# Patient Record
Sex: FEMALE | Race: WHITE | Employment: OTHER | URBAN - METROPOLITAN AREA
[De-identification: names, ages, dates, MRNs, and addresses within clinical notes are randomized per-mention and may not be internally consistent; named-entity substitution may affect disease eponyms.]

---

## 2017-11-15 ENCOUNTER — APPOINTMENT (OUTPATIENT)
Dept: PHYSICAL THERAPY | Facility: CLINIC | Age: 82
End: 2017-11-15
Payer: MEDICARE

## 2017-11-27 ENCOUNTER — APPOINTMENT (OUTPATIENT)
Dept: PHYSICAL THERAPY | Facility: CLINIC | Age: 82
End: 2017-11-27
Payer: MEDICARE

## 2017-11-27 PROCEDURE — G8978 MOBILITY CURRENT STATUS: HCPCS

## 2017-11-27 PROCEDURE — 97162 PT EVAL MOD COMPLEX 30 MIN: CPT

## 2017-11-27 PROCEDURE — G8979 MOBILITY GOAL STATUS: HCPCS

## 2018-11-19 ENCOUNTER — APPOINTMENT (EMERGENCY)
Dept: RADIOLOGY | Facility: HOSPITAL | Age: 83
DRG: 280 | End: 2018-11-19
Payer: MEDICARE

## 2018-11-19 ENCOUNTER — HOSPITAL ENCOUNTER (INPATIENT)
Facility: HOSPITAL | Age: 83
LOS: 5 days | Discharge: NURSING, REHAB OR LONG-TERM CARE | DRG: 280 | End: 2018-11-24
Attending: EMERGENCY MEDICINE | Admitting: INTERNAL MEDICINE
Payer: MEDICARE

## 2018-11-19 ENCOUNTER — APPOINTMENT (INPATIENT)
Dept: NEUROLOGY | Facility: AMBULATORY SURGERY CENTER | Age: 83
DRG: 280 | End: 2018-11-19
Payer: MEDICARE

## 2018-11-19 DIAGNOSIS — R52 PAIN: ICD-10-CM

## 2018-11-19 DIAGNOSIS — G93.40 ENCEPHALOPATHY: ICD-10-CM

## 2018-11-19 DIAGNOSIS — I16.0 HYPERTENSIVE URGENCY: ICD-10-CM

## 2018-11-19 DIAGNOSIS — K59.00 CONSTIPATION: ICD-10-CM

## 2018-11-19 DIAGNOSIS — R41.89 COGNITIVE IMPAIRMENT: ICD-10-CM

## 2018-11-19 DIAGNOSIS — N17.9 AKI (ACUTE KIDNEY INJURY) (HCC): ICD-10-CM

## 2018-11-19 DIAGNOSIS — L29.9 ITCHY SKIN: ICD-10-CM

## 2018-11-19 DIAGNOSIS — S09.90XA MINOR HEAD INJURY, INITIAL ENCOUNTER: ICD-10-CM

## 2018-11-19 DIAGNOSIS — E03.9 HYPOTHYROIDISM: ICD-10-CM
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
## 2018-11-19 DIAGNOSIS — I21.4 NSTEMI (NON-ST ELEVATED MYOCARDIAL INFARCTION) (HCC): Primary | ICD-10-CM

## 2018-11-19 DIAGNOSIS — I16.0 ASYMPTOMATIC HYPERTENSIVE URGENCY: ICD-10-CM

## 2018-11-19 PROBLEM — D75.1 POLYCYTHEMIA: Status: ACTIVE | Noted: 2018-11-19

## 2018-11-19 PROBLEM — I16.9 HYPERTENSIVE CRISIS: Status: ACTIVE | Noted: 2018-11-19

## 2018-11-19 PROBLEM — R55 NEAR SYNCOPE: Status: ACTIVE | Noted: 2018-11-19

## 2018-11-19 PROBLEM — R29.6 UNWITNESSED FALL: Status: ACTIVE | Noted: 2018-11-19

## 2018-11-19 PROBLEM — D72.829 LEUKOCYTOSIS: Status: ACTIVE | Noted: 2018-11-19

## 2018-11-19 PROBLEM — E83.52 HYPERCALCEMIA: Status: ACTIVE | Noted: 2018-11-19

## 2018-11-19 LAB
ALBUMIN SERPL BCP-MCNC: 4 G/DL (ref 3.5–5)
ALP SERPL-CCNC: 111 U/L (ref 46–116)
ALT SERPL W P-5'-P-CCNC: 56 U/L (ref 12–78)
AMPHETAMINES SERPL QL SCN: NEGATIVE
ANION GAP SERPL CALCULATED.3IONS-SCNC: 9 MMOL/L (ref 4–13)
APTT PPP: >210 SECONDS (ref 26–38)
AST SERPL W P-5'-P-CCNC: 53 U/L (ref 5–45)
ATRIAL RATE: 79 BPM
BARBITURATES UR QL: NEGATIVE
BASE EX.OXY STD BLDV CALC-SCNC: 67.7 % (ref 60–80)
BASE EXCESS BLDV CALC-SCNC: -3.8 MMOL/L
BASOPHILS # BLD AUTO: 0.06 THOUSANDS/ΜL (ref 0–0.1)
BASOPHILS NFR BLD AUTO: 0 % (ref 0–1)
BENZODIAZ UR QL: NEGATIVE
BILIRUB SERPL-MCNC: 1.33 MG/DL (ref 0.2–1)
BUN SERPL-MCNC: 60 MG/DL (ref 5–25)
CALCIUM SERPL-MCNC: 10.8 MG/DL (ref 8.3–10.1)
CHLORIDE SERPL-SCNC: 109 MMOL/L (ref 100–108)
CK MB SERPL-MCNC: 21.4 NG/ML (ref 0–5)
CK MB SERPL-MCNC: 5.5 % (ref 0–2.5)
CK SERPL-CCNC: 387 U/L (ref 26–192)
CO2 SERPL-SCNC: 24 MMOL/L (ref 21–32)
COCAINE UR QL: NEGATIVE
CREAT SERPL-MCNC: 1.04 MG/DL (ref 0.6–1.3)
EOSINOPHIL # BLD AUTO: 0.18 THOUSAND/ΜL (ref 0–0.61)
EOSINOPHIL NFR BLD AUTO: 1 % (ref 0–6)
ERYTHROCYTE [DISTWIDTH] IN BLOOD BY AUTOMATED COUNT: 14.3 % (ref 11.6–15.1)
GFR SERPL CREATININE-BSD FRML MDRD: 47 ML/MIN/1.73SQ M
GLUCOSE SERPL-MCNC: 117 MG/DL (ref 65–140)
HCO3 BLDV-SCNC: 22.4 MMOL/L (ref 24–30)
HCT VFR BLD AUTO: 57.7 % (ref 34.8–46.1)
HGB BLD-MCNC: 18.8 G/DL (ref 11.5–15.4)
IMM GRANULOCYTES # BLD AUTO: 0.17 THOUSAND/UL (ref 0–0.2)
IMM GRANULOCYTES NFR BLD AUTO: 1 % (ref 0–2)
INR PPP: 1.25 (ref 0.86–1.17)
LYMPHOCYTES # BLD AUTO: 2.56 THOUSANDS/ΜL (ref 0.6–4.47)
LYMPHOCYTES NFR BLD AUTO: 16 % (ref 14–44)
MCH RBC QN AUTO: 30 PG (ref 26.8–34.3)
MCHC RBC AUTO-ENTMCNC: 32.6 G/DL (ref 31.4–37.4)
MCV RBC AUTO: 92 FL (ref 82–98)
METHADONE UR QL: NEGATIVE
MONOCYTES # BLD AUTO: 1.42 THOUSAND/ΜL (ref 0.17–1.22)
MONOCYTES NFR BLD AUTO: 9 % (ref 4–12)
NEUTROPHILS # BLD AUTO: 12.06 THOUSANDS/ΜL (ref 1.85–7.62)
NEUTS SEG NFR BLD AUTO: 73 % (ref 43–75)
NRBC BLD AUTO-RTO: 0 /100 WBCS
O2 CT BLDV-SCNC: 17 ML/DL
OPIATES UR QL SCN: NEGATIVE
P AXIS: 66 DEGREES
PCO2 BLDV: 44.6 MM HG (ref 42–50)
PCP UR QL: NEGATIVE
PH BLDV: 7.32 [PH] (ref 7.3–7.4)
PLATELET # BLD AUTO: 198 THOUSANDS/UL (ref 149–390)
PMV BLD AUTO: 12.6 FL (ref 8.9–12.7)
PO2 BLDV: 39.4 MM HG (ref 35–45)
POTASSIUM SERPL-SCNC: 4.4 MMOL/L (ref 3.5–5.3)
PR INTERVAL: 134 MS
PROT SERPL-MCNC: 7.8 G/DL (ref 6.4–8.2)
PROTHROMBIN TIME: 15.8 SECONDS (ref 11.8–14.2)
QRS AXIS: -70 DEGREES
QRSD INTERVAL: 90 MS
QT INTERVAL: 388 MS
QTC INTERVAL: 444 MS
RBC # BLD AUTO: 6.26 MILLION/UL (ref 3.81–5.12)
SODIUM SERPL-SCNC: 142 MMOL/L (ref 136–145)
T WAVE AXIS: 107 DEGREES
T4 FREE SERPL-MCNC: 0.93 NG/DL (ref 0.76–1.46)
THC UR QL: NEGATIVE
TROPONIN I SERPL-MCNC: 0.24 NG/ML
TROPONIN I SERPL-MCNC: 0.27 NG/ML
TROPONIN I SERPL-MCNC: 0.27 NG/ML
TROPONIN I SERPL-MCNC: 0.28 NG/ML
TSH SERPL DL<=0.05 MIU/L-ACNC: 11.6 UIU/ML (ref 0.36–3.74)
VENTRICULAR RATE: 79 BPM
WBC # BLD AUTO: 16.45 THOUSAND/UL (ref 4.31–10.16)

## 2018-11-19 PROCEDURE — 92610 EVALUATE SWALLOWING FUNCTION: CPT

## 2018-11-19 PROCEDURE — 93010 ELECTROCARDIOGRAM REPORT: CPT | Performed by: INTERNAL MEDICINE

## 2018-11-19 PROCEDURE — 82805 BLOOD GASES W/O2 SATURATION: CPT | Performed by: INTERNAL MEDICINE

## 2018-11-19 PROCEDURE — 85610 PROTHROMBIN TIME: CPT | Performed by: EMERGENCY MEDICINE

## 2018-11-19 PROCEDURE — 85730 THROMBOPLASTIN TIME PARTIAL: CPT | Performed by: EMERGENCY MEDICINE

## 2018-11-19 PROCEDURE — 87040 BLOOD CULTURE FOR BACTERIA: CPT | Performed by: INTERNAL MEDICINE

## 2018-11-19 PROCEDURE — 99221 1ST HOSP IP/OBS SF/LOW 40: CPT | Performed by: INTERNAL MEDICINE

## 2018-11-19 PROCEDURE — 96360 HYDRATION IV INFUSION INIT: CPT

## 2018-11-19 PROCEDURE — 70450 CT HEAD/BRAIN W/O DYE: CPT

## 2018-11-19 PROCEDURE — 84439 ASSAY OF FREE THYROXINE: CPT | Performed by: EMERGENCY MEDICINE

## 2018-11-19 PROCEDURE — 85025 COMPLETE CBC W/AUTO DIFF WBC: CPT | Performed by: EMERGENCY MEDICINE

## 2018-11-19 PROCEDURE — 93005 ELECTROCARDIOGRAM TRACING: CPT

## 2018-11-19 PROCEDURE — 81001 URINALYSIS AUTO W/SCOPE: CPT | Performed by: INTERNAL MEDICINE

## 2018-11-19 PROCEDURE — 99223 1ST HOSP IP/OBS HIGH 75: CPT | Performed by: INTERNAL MEDICINE

## 2018-11-19 PROCEDURE — G8997 SWALLOW GOAL STATUS: HCPCS

## 2018-11-19 PROCEDURE — 36415 COLL VENOUS BLD VENIPUNCTURE: CPT | Performed by: EMERGENCY MEDICINE

## 2018-11-19 PROCEDURE — G8996 SWALLOW CURRENT STATUS: HCPCS

## 2018-11-19 PROCEDURE — 95816 EEG AWAKE AND DROWSY: CPT | Performed by: PSYCHIATRY & NEUROLOGY

## 2018-11-19 PROCEDURE — 71046 X-RAY EXAM CHEST 2 VIEWS: CPT

## 2018-11-19 PROCEDURE — 99285 EMERGENCY DEPT VISIT HI MDM: CPT

## 2018-11-19 PROCEDURE — 80053 COMPREHEN METABOLIC PANEL: CPT | Performed by: EMERGENCY MEDICINE

## 2018-11-19 PROCEDURE — 84484 ASSAY OF TROPONIN QUANT: CPT | Performed by: PHYSICIAN ASSISTANT

## 2018-11-19 PROCEDURE — 72125 CT NECK SPINE W/O DYE: CPT

## 2018-11-19 PROCEDURE — 82550 ASSAY OF CK (CPK): CPT | Performed by: EMERGENCY MEDICINE

## 2018-11-19 PROCEDURE — 84443 ASSAY THYROID STIM HORMONE: CPT | Performed by: EMERGENCY MEDICINE

## 2018-11-19 PROCEDURE — 1124F ACP DISCUSS-NO DSCNMKR DOCD: CPT | Performed by: INTERNAL MEDICINE

## 2018-11-19 PROCEDURE — 80307 DRUG TEST PRSMV CHEM ANLYZR: CPT | Performed by: INTERNAL MEDICINE

## 2018-11-19 PROCEDURE — 84484 ASSAY OF TROPONIN QUANT: CPT | Performed by: EMERGENCY MEDICINE

## 2018-11-19 PROCEDURE — 82553 CREATINE MB FRACTION: CPT | Performed by: EMERGENCY MEDICINE

## 2018-11-19 PROCEDURE — 95816 EEG AWAKE AND DROWSY: CPT

## 2018-11-19 PROCEDURE — 84484 ASSAY OF TROPONIN QUANT: CPT | Performed by: INTERNAL MEDICINE

## 2018-11-19 RX ORDER — HEPARIN SODIUM 10000 [USP'U]/100ML
3-20 INJECTION, SOLUTION INTRAVENOUS
Status: DISCONTINUED | OUTPATIENT
Start: 2018-11-19 | End: 2018-11-19

## 2018-11-19 RX ORDER — ASPIRIN 81 MG/1
81 TABLET ORAL DAILY
Status: DISCONTINUED | OUTPATIENT
Start: 2018-11-20 | End: 2018-11-24 | Stop reason: HOSPADM

## 2018-11-19 RX ORDER — QUINAPRIL 20 MG/1
20 TABLET ORAL
COMMUNITY

## 2018-11-19 RX ORDER — SODIUM CHLORIDE 9 MG/ML
100 INJECTION, SOLUTION INTRAVENOUS CONTINUOUS
Status: DISCONTINUED | OUTPATIENT
Start: 2018-11-19 | End: 2018-11-19

## 2018-11-19 RX ORDER — HYDRALAZINE HYDROCHLORIDE 20 MG/ML
10 INJECTION INTRAMUSCULAR; INTRAVENOUS EVERY 6 HOURS PRN
Status: DISCONTINUED | OUTPATIENT
Start: 2018-11-19 | End: 2018-11-24 | Stop reason: HOSPADM

## 2018-11-19 RX ORDER — METOPROLOL TARTRATE 5 MG/5ML
5 INJECTION INTRAVENOUS ONCE
Status: COMPLETED | OUTPATIENT
Start: 2018-11-19 | End: 2018-11-19

## 2018-11-19 RX ORDER — OXYBUTYNIN CHLORIDE 5 MG/1
5 TABLET, EXTENDED RELEASE ORAL DAILY
Status: DISCONTINUED | OUTPATIENT
Start: 2018-11-19 | End: 2018-11-20

## 2018-11-19 RX ORDER — LEVOTHYROXINE SODIUM 0.03 MG/1
25 TABLET ORAL
Status: DISCONTINUED | OUTPATIENT
Start: 2018-11-19 | End: 2018-11-24 | Stop reason: HOSPADM

## 2018-11-19 RX ORDER — HYDRALAZINE HYDROCHLORIDE 20 MG/ML
5 INJECTION INTRAMUSCULAR; INTRAVENOUS EVERY 6 HOURS PRN
Status: DISCONTINUED | OUTPATIENT
Start: 2018-11-19 | End: 2018-11-19

## 2018-11-19 RX ORDER — SODIUM CHLORIDE 9 MG/ML
50 INJECTION, SOLUTION INTRAVENOUS ONCE
Status: COMPLETED | OUTPATIENT
Start: 2018-11-19 | End: 2018-11-19

## 2018-11-19 RX ORDER — ASPIRIN 81 MG/1
324 TABLET, CHEWABLE ORAL ONCE
Status: COMPLETED | OUTPATIENT
Start: 2018-11-19 | End: 2018-11-19

## 2018-11-19 RX ORDER — HEPARIN SODIUM 1000 [USP'U]/ML
1650 INJECTION, SOLUTION INTRAVENOUS; SUBCUTANEOUS AS NEEDED
Status: DISCONTINUED | OUTPATIENT
Start: 2018-11-19 | End: 2018-11-19

## 2018-11-19 RX ORDER — LABETALOL HYDROCHLORIDE 5 MG/ML
5 INJECTION, SOLUTION INTRAVENOUS EVERY 6 HOURS PRN
Status: DISCONTINUED | OUTPATIENT
Start: 2018-11-19 | End: 2018-11-24 | Stop reason: HOSPADM

## 2018-11-19 RX ORDER — AMLODIPINE BESYLATE 5 MG/1
5 TABLET ORAL DAILY
Status: DISCONTINUED | OUTPATIENT
Start: 2018-11-19 | End: 2018-11-22

## 2018-11-19 RX ORDER — SOLIFENACIN SUCCINATE 5 MG/1
5 TABLET, FILM COATED ORAL DAILY
COMMUNITY

## 2018-11-19 RX ORDER — LEVOTHYROXINE SODIUM 0.03 MG/1
25 TABLET ORAL DAILY
COMMUNITY

## 2018-11-19 RX ORDER — ACETAMINOPHEN 325 MG/1
650 TABLET ORAL EVERY 6 HOURS PRN
Status: DISCONTINUED | OUTPATIENT
Start: 2018-11-19 | End: 2018-11-20

## 2018-11-19 RX ORDER — ATORVASTATIN CALCIUM 40 MG/1
40 TABLET, FILM COATED ORAL
Status: DISCONTINUED | OUTPATIENT
Start: 2018-11-19 | End: 2018-11-20

## 2018-11-19 RX ORDER — HEPARIN SODIUM 1000 [USP'U]/ML
3300 INJECTION, SOLUTION INTRAVENOUS; SUBCUTANEOUS AS NEEDED
Status: DISCONTINUED | OUTPATIENT
Start: 2018-11-19 | End: 2018-11-19

## 2018-11-19 RX ORDER — CARVEDILOL 3.12 MG/1
3.12 TABLET ORAL 2 TIMES DAILY WITH MEALS
Status: DISCONTINUED | OUTPATIENT
Start: 2018-11-19 | End: 2018-11-21

## 2018-11-19 RX ORDER — HEPARIN SODIUM 1000 [USP'U]/ML
3300 INJECTION, SOLUTION INTRAVENOUS; SUBCUTANEOUS ONCE
Status: COMPLETED | OUTPATIENT
Start: 2018-11-19 | End: 2018-11-19

## 2018-11-19 RX ADMIN — HYDRALAZINE HYDROCHLORIDE 5 MG: 20 INJECTION INTRAMUSCULAR; INTRAVENOUS at 15:09

## 2018-11-19 RX ADMIN — OXYBUTYNIN CHLORIDE 5 MG: 5 TABLET, EXTENDED RELEASE ORAL at 14:22

## 2018-11-19 RX ADMIN — HEPARIN SODIUM AND DEXTROSE 12 UNITS/KG/HR: 10000; 5 INJECTION INTRAVENOUS at 11:41

## 2018-11-19 RX ADMIN — HEPARIN SODIUM 3300 UNITS: 1000 INJECTION INTRAVENOUS; SUBCUTANEOUS at 11:41

## 2018-11-19 RX ADMIN — SODIUM CHLORIDE 1000 ML: 0.9 INJECTION, SOLUTION INTRAVENOUS at 10:10

## 2018-11-19 RX ADMIN — AMLODIPINE BESYLATE 5 MG: 5 TABLET ORAL at 14:22

## 2018-11-19 RX ADMIN — LEVOTHYROXINE SODIUM 25 MCG: 25 TABLET ORAL at 14:22

## 2018-11-19 RX ADMIN — Medication 324 MG: at 11:43

## 2018-11-19 RX ADMIN — ATORVASTATIN CALCIUM 40 MG: 40 TABLET, FILM COATED ORAL at 16:51

## 2018-11-19 RX ADMIN — SODIUM CHLORIDE 100 ML/HR: 0.9 INJECTION, SOLUTION INTRAVENOUS at 14:12

## 2018-11-19 RX ADMIN — CARVEDILOL 3.12 MG: 3.12 TABLET, FILM COATED ORAL at 16:51

## 2018-11-19 RX ADMIN — SODIUM CHLORIDE 50 ML/HR: 0.9 INJECTION, SOLUTION INTRAVENOUS at 16:56

## 2018-11-19 RX ADMIN — METOPROLOL TARTRATE 5 MG: 1 INJECTION, SOLUTION INTRAVENOUS at 11:35

## 2018-11-19 NOTE — ASSESSMENT & PLAN NOTE
Patient denies any fall, but per EMS, patient was found on the floor in the pool of feces and was conscious  It is unclear if patient had syncopal event or seizure like activity  Near syncopal event vs syncope related to dehydration vs hypertensive encephalopathy vs NSTEMI vs infection (less likely)? CT head shows No acute intracranial abnormality with some microangiopathic changes  CT C-spine shows no acute fracture  PLAN:  · Monitor on telemetry  · IV hydration  · Trend troponin  · Check echocardiogram  · Check UA  · Check VBG  · Check blood cultures,  · Check UDS  · Check orthostatic vitals  · PT OT evaluation  · Check EEG

## 2018-11-19 NOTE — ASSESSMENT & PLAN NOTE
Likely related to noncompliance of medications  She is on Quinapril at home  As per her, recently home blood pressure has been running high in 150s at home  Now with QUIANA and encephalopathy  Blood pressure down to 178 in the ER with 5 mg Lopressor  CT head negative for any intracranial bleed  PLAN:  · P r n  Labetalol for systolic blood pressure more than 180  · Goal blood pressure today between 160-180  · Will hold off on quinapril for now given QUIANA  · Start amlodipine p jaylen Peña

## 2018-11-19 NOTE — PLAN OF CARE
Problem: SLP ADULT - SWALLOWING, IMPAIRED  Goal: Initial SLP swallow eval performed  Outcome: Completed Date Met: 11/19/18

## 2018-11-19 NOTE — ASSESSMENT & PLAN NOTE
Has history of polycythemia with baseline hemoglobin of 16- 16 5 with low to normal erythropoietin  Not on any treatment for now  Hemoglobin is 18 , likely increased due to dehydration  PLAN:  -IV hydration   -Trend CBC for now

## 2018-11-19 NOTE — SPEECH THERAPY NOTE
Speech-Language Pathology Bedside Swallow Evaluation      Patient Name: Hector Luciano    WSLBI'D Date: 11/19/2018     Problem List  Patient Active Problem List   Diagnosis    Near syncope with unwitnessed fall    Leukocytosis    Polycythemia    [facility] failure [facility]    NSTEMI (non-ST elevated myocardial infarction) ([facility]    Encephalopathy    Hypertensive crisis    Hypercalcemia       Past Medical History  Past Medical History:   Diagnosis Date    Hypertension        Past Surgical History  History reviewed  No pertinent surgical history  Summary   Pt presented with s/s suggestive of minimal oral dysphagia and suspected WFL pharyngeal swallowing skills  Symptoms or concerns included oral residue with cookies, cleared with liquid wash  No s/s aspiration noted with the materials assessed today  Suspect oral residual to be d/t significant oral dryness prior to PO intake  Suspect pt will be able to tolerate regular diet and thin liquids, ST to f/u x1-2 for analysis of diet tolerance  Risk for Aspiration: low    Recommendations: regular diet and thin liquids     Recommended Form of Meds: whole with liquid     Aspiration precautions and compensatory swallowing strategies: upright posture, only feed when fully alert, slow rate of feeding, small bites/sips and alternating bites and sips      Current Medical Status per Dr Alfredito Martel H&P 11/19/2018  Hector Luciano is a 80 y o  female with medical history of hypertension, hyperlipidemia and hypothyroidism was brought in by EMS for unwitnessed fall and possible near-syncopal event  Patient is a poor historian  As per ER staff, patient lives independently  And her neighbors noted that newspapers was stacking for 3 days and the got concerned about her which prompted them to call EMS  On EMS arrival, patient was found on the floor with unkempt house and the cat feces in apartment   Patient was conscious but was not able to get up by herself due to generalized weakness, s she was brought to ER,  As per patient, she was in her bed and was normal prior to going to bed last night  She says that she felt too weak to get of of bed  When I asked her about newspaper outside her apartment, she mentioned that she did not take them from door as she did not want to read newspaper  She reports extreme generalized weakness prior to coming to ER  She reports poor PO intake  She says she does not drink enough water, and eats only frozen food which she gets from store but she has not been eating well lately  She also reports some urinary frequency  Per patient, her Bp has been running high lately with systolic BP in 444R  She Denied any syncopal event  She denied any chest pain, lightheadedness, nausea, vomiting, seizure-like activity at home or in ER  Past medical history:  Please see H&P for details    Special Studies:  CXR 11/19/2018 IMPRESSION:  No acute cardiopulmonary disease  Social/Education/Vocational Hx:  Pt lives alone      Swallow Information   Current Risks for Dysphagia & Aspiration: reported coughing during RN aspiration screening     Current Symptoms/Concerns: Pt reports she is excessively dry    Current Diet: NPO      Baseline Diet: regular diet and thin liquids, pt reported she avoids hard food items such as steak      Baseline Assessment   Behavior/Cognition: alert    Speech/Language Status: able to participate in conversation and able to follow commands    Patient Positioning: upright in bed    Pain Status/Interventions/Response to Interventions:  No report of or nonverbal indications of pain  Swallow Mechanism Exam   Oral-motor structures and function are WNL for symmetry, strength, ROM & coordination    Facial: symmetrical  Labial: anterior spillage initially, however suspect this was due to dry mucosa rather than labial weakness  Lingual: WFL  Velum: symmetrical  Mandible: adequate ROM  Dentition: adequate  Vocal quality:clear/adequate Volitional Cough: adequate     Consistencies Assessed and Performance   Consistencies Administered: thin liquids, puree and hard solids  Specific materials administered included water, pudding, and leeann doone cookies    Oral Stage: minimal  Mastication was adequate with the materials administered today  Bolus formation and transfer were functional   Mild oral residue noted with cookie, suspected to be due to significant oral dryness  No overt s/s reduced oral control  Pharyngeal Stage: WFL  Swallow Mechanics:  Swallowing initiation appeared prompt  Laryngeal rise was palpated and judged to be within functional limits  No coughing, throat clearing, change in vocal quality or respiratory status noted today  Esophageal Concerns: none reported    Strategies and Efficacy: Alternation of liquids and solids effectively cleared oral residual      Summary and Recommendations (see above)      Results Reviewed with: patient and RN     Treatment Recommended: Brief ST f/u during meal to further assess regular diet tolerance    Frequency of treatment: 1-2x    Patient Stated Goal: "I'm so thirsty "    Dysphagia Goals per SLP: pt will tolerate regular diet with thin liquids without s/s of aspiration x72hrs    Pt Education: initiated  Pt and caregivers would benefit from/require continued education

## 2018-11-19 NOTE — CONSULTS
Consultation - Cardiology Team One  Leticia Cleaning 80 y o  female MRN: [id number]  Unit/Bed#: [facility] 405-01 Encounter: [id number]    Inpatient consult to Cardiology  Consult performed by: Bhupendra Pa ordered by: [facility] [place], operated by [facility]      Physician Requesting Consult: Virginia He MD  Reason for Consult / Principal Problem: NSTEMI       Assessment/ Plan    1  NSTEMI type II in the setting of hypertensive urgency and QUIANA   Troponin 0 28/0 27  Currently on heparin gtt  Echocardiogram pending    Echocardiogram 05/02/2016 showed EF 89%, mild diastolic dysfunction with normal filling pressures, aortic valve is mildly thickened and calcified and mild TR    2  Possible syncope vs fall vs seizure- patient denying syncope/LOC    EEG pending   Echo pending     3  Hypertensive urgency- /111 on admission   BP improved after metoprolol 5 mg IV x 1 dose: 171/113  Holding home ACE I due to #4  Started on amlodipine  BP now 222/88  Will give hydralazine IV now and continue to monitor, d/w RN  4  QUIANA in the setting of dehydration- creatinine 1 04  Baseline appears 0 6  On IVF     5  Hypothyroidism- TSH 11 6      History of Present Illness   HPI: Leticia Cleaning is a 80y o  year old female who has a history of hypothyroidism and hypertension  She does not follow with a cardiologist              She presents to [facility]"" ER 11/19/18 via EMS due to being found at home on the floor in unkept conditions  Her landlord was becoming concerned because of multiple newspapers at her door and called EMS  Patient is a poor historian but per records patient was found on the floor with a bruise on her forehead  She reports feeling weak and tired for the past week and not eating well  She denies LOC or syncope but does report being on the floor for "awhile " Patient denies history of syncope in the past      Cardiology consulted for NSTEMI and possible syncope  Patient having EEG completed now  Troponin 0 28/0  27   ECG showed NSR with non specific ST and T wave abnormalities  Patient denies cardiac symptoms including chest pain, SOB or palpitations  She reports no history of known CAD, heart failure or dysrhythmias  She does have a history of hypertension and takes quinapril at home  Patient reports she has not been compliant with her medication  BP on admission was 243/111  She received metoprolol 5 mg IV x 1 dose and BP  Improved to 178/83 but elevated again 227/89  Patient denies headache, chest pain, SOB or dizziness  Patient lives in a apartment alone  She denies tobacco use or alcohol use  EKG reviewed personally:  Normal sinus rhythm   Ventricular rate 79 bpm  OK interval 134 ms  QRSD interval 90 ms  QT interval 388 ms  QTc interval 444 ms    Telemetry reviewed personally:   NSR HR 65-70s    Review of Systems   Constitution: Positive for decreased appetite, weakness and malaise/fatigue  Cardiovascular: Negative for chest pain, dyspnea on exertion, leg swelling, orthopnea, palpitations and syncope  Respiratory: Negative for shortness of breath  Musculoskeletal: Positive for falls  Gastrointestinal: Negative for nausea and vomiting  Neurological: Negative for dizziness and light-headedness  Psychiatric/Behavioral: Negative for altered mental status  Historical Information   Past Medical History:   Diagnosis Date    Hypertension      History reviewed  No pertinent surgical history  History   Alcohol Use    Yes     History   Drug Use No     History   Smoking Status    Former Smoker    Types: Cigarettes   Smokeless Tobacco    Never Used     Family History: History reviewed  No pertinent family history      Meds/Allergies   all current active meds have been reviewed and current meds:   Current Facility-Administered Medications   Medication Dose Route Frequency    acetaminophen (TYLENOL) tablet 650 mg  650 mg Oral Q6H PRN    amLODIPine (NORVASC) tablet 5 mg  5 mg Oral Daily    [START ON 11/20/2018] aspirin (ECOTRIN LOW STRENGTH) EC tablet 81 mg  81 mg Oral Daily    atorvastatin (LIPITOR) tablet 40 mg  40 mg Oral Daily With Dinner    bimatoprost (LUMIGAN) 0 01 % ophthalmic solution 1 drop  1 drop Both Eyes HS    heparin (porcine) 25,000 units in 250 mL infusion (premix)  3-20 Units/kg/hr (Order-Specific) Intravenous Titrated    heparin (porcine) injection 1,650 Units  1,650 Units Intravenous PRN    heparin (porcine) injection 3,300 Units  3,300 Units Intravenous PRN    hydrALAZINE (APRESOLINE) injection 5 mg  5 mg Intravenous Q6H PRN    labetalol (NORMODYNE) injection 5 mg  5 mg Intravenous Q6H PRN    levothyroxine tablet 25 mcg  25 mcg Oral Early Morning    oxybutynin (DITROPAN-XL) 24 hr tablet 5 mg  5 mg Oral Daily    sodium chloride 0 9 % infusion  100 mL/hr Intravenous Continuous       heparin (porcine) 3-20 Units/kg/hr (Order-Specific) Last Rate: 9 Units/kg/hr (11/19/18 1420)   sodium chloride 100 mL/hr Last Rate: 100 mL/hr (11/19/18 1412)       No Known Allergies    Objective   Vitals: Blood pressure (!) 222/88, pulse 67, temperature 97 6 °F (36 4 °C), temperature source Rectal, resp  rate (!) 26, weight 56 2 kg (124 lb), SpO2 95 %  ,     There is no height or weight on file to calculate BMI ,     Systolic (68RPI), ELZ:194 , Min:171 , ECI:847     Diastolic (87IGV), DSM:25, Min:83, Max:113      Intake/Output Summary (Last 24 hours) at 11/19/18 1444  Last data filed at 11/19/18 1337   Gross per 24 hour   Intake             1000 ml   Output               91 ml   Net              909 ml     Weight (last 2 days)     Date/Time   Weight    11/19/18 1006  56 2 (124)            Invasive Devices     Peripheral Intravenous Line            Peripheral IV 11/19/18 Left Wrist less than 1 day    Peripheral IV 11/19/18 Right Antecubital less than 1 day              Physical Exam   Constitutional: She is oriented to person, place, and time  No distress  Neck: Neck supple     Cardiovascular: Normal rate, regular rhythm and intact distal pulses  Pulmonary/Chest: Effort normal  No respiratory distress  She has no wheezes  No crackles  RA    Abdominal: Soft  Bowel sounds are normal    Musculoskeletal: She exhibits no edema  Neurological: She is alert and oriented to person, place, and time  Skin: Skin is warm and dry  She is not diaphoretic  Psychiatric: She has a normal mood and affect  Her behavior is normal          LABORATORY RESULTS:    Results from last 7 days  Lab Units 11/19/18  1313 11/19/18  1010   CK TOTAL U/L  --  387*   TROPONIN I ng/mL 0 27* 0 28*   CK MB INDEX %  --  5 5*     CBC with diff:   Results from last 7 days  Lab Units 11/19/18  1010   WBC Thousand/uL 16 45*   HEMOGLOBIN g/dL 18 8*   HEMATOCRIT % 57 7*   MCV fL 92   PLATELETS Thousands/uL 198   MCH pg 30 0   MCHC g/dL 32 6   RDW % 14 3   MPV fL 12 6   NRBC AUTO /100 WBCs 0       CMP:  Results from last 7 days  Lab Units 11/19/18  1010   POTASSIUM mmol/L 4 4   CHLORIDE mmol/L 109*   CO2 mmol/L 24   BUN mg/dL 60*   CREATININE mg/dL 1 04   CALCIUM mg/dL 10 8*   AST U/L 53*   ALT U/L 56   ALK PHOS U/L 111   EGFR ml/min/1 73sq m 47       BMP:  Results from last 7 days  Lab Units 11/19/18  1010   POTASSIUM mmol/L 4 4   CHLORIDE mmol/L 109*   CO2 mmol/L 24   BUN mg/dL 60*   CREATININE mg/dL 1 04   CALCIUM mg/dL 10 8*          Results from last 7 days  Lab Units 11/19/18  1010   TSH 3RD GENERATON uIU/mL 11 600*   FREE T4 ng/dL 0 93         Results from last 7 days  Lab Units 11/19/18  1139   INR  1 25*     Lipid Profile:   No results found for: CHOL  No results found for: HDL  No results found for: LDLCALC  No results found for: TRIG      Cardiac testing:   No results found for this or any previous visit  No results found for this or any previous visit  No procedure found  No results found for this or any previous visit  Imaging: I have personally reviewed pertinent reports      Xr Chest 2 Views    Result Date: 11/19/2018  Narrative: CHEST INDICATION:   found down  COMPARISON:  Chest radiographs March 10, 2013 EXAM PERFORMED/VIEWS:  XR CHEST PA & LATERAL Images: 2 FINDINGS: The heart is mildly enlarged  Atherosclerotic changes in the aorta  The lungs are clear and hyperinflated  No pneumothorax or pleural effusion  Osseous structures demineralized  Increase in the thoracic kyphotic curvature  Degenerative changes in the right shoulder with findings suggestive of chronic rotator cuff injury  Postoperative changes left shoulder  Impression: No acute cardiopulmonary disease  Workstation performed: [id number]     Ct Head Without Contrast    Result Date: 11/19/2018  Narrative: CT BRAIN - WITHOUT CONTRAST INDICATION:   found down  pt found down, confused, bruise mid forehead, unsure how she fell COMPARISON:  None  TECHNIQUE:  CT examination of the brain was performed  In addition to axial images, coronal 2D reformatted images were created and submitted for interpretation  Radiation dose length product (DLP) for this visit:  926 28 mGy-cm   This examination, like all CT scans performed in the [facility], was performed utilizing techniques to minimize radiation dose exposure, including the use of iterative  reconstruction and automated exposure control  IMAGE QUALITY:  Diagnostic  FINDINGS: PARENCHYMA: Decreased attenuation is noted in periventricular and subcortical white matter demonstrating an appearance that is statistically most likely to represent moderate microangiopathic change  Atherosclerotic calcifications of the cavernous segment of the internal carotid artery are moderate  No CT signs of acute infarction  No intracranial mass, mass effect or midline shift  No acute parenchymal hemorrhage  VENTRICLES AND EXTRA-AXIAL SPACES:  Normal for the patient's age   VISUALIZED ORBITS AND PARANASAL SINUSES:  Bilateral lens implants CALVARIUM AND EXTRACRANIAL SOFT TISSUES:  Normal      Impression: No acute intracranial abnormality  Microangiopathic changes  Workstation performed: [id number]     Ct Cervical Spine Without Contrast    Result Date: 11/19/2018  Narrative: CT CERVICAL SPINE - WITHOUT CONTRAST INDICATION:   found down  pt found down, confused, bruise mid forehead, unsure how she fell COMPARISON:  None  TECHNIQUE:  CT examination of the cervical spine was performed without intravenous contrast   Contiguous axial images were obtained  Sagittal and coronal reconstructions were performed  Radiation dose length product (DLP) for this visit:  316 95 mGy-cm   This examination, like all CT scans performed in the [facility], was performed utilizing techniques to minimize radiation dose exposure, including the use of iterative  reconstruction and automated exposure control  IMAGE QUALITY:  Diagnostic  FINDINGS: ALIGNMENT:  Trace grade 1 anterolisthesis of C2 on C3  VERTEBRAL BODIES:  No fracture  DEGENERATIVE CHANGES:  Scattered multilevel degenerative endplate changes noted  Advanced degenerative changes at the C1-C2 articulation where there is right-sided ankylosis and prominent ventral osteophyte formation anteriorly at the C3 1 C2 articulation  Multilevel facet arthropathy noted elsewhere  PREVERTEBRAL AND PARASPINAL SOFT TISSUES:  Atherosclerotic calcifications noted  THORACIC INLET:  Normal      Impression: 1  No acute fracture  2   Mild grade 1 anterolisthesis of C2 on C3 may be spondylotic in etiology  3   Advanced degenerative changes anteriorly at the C1-C2 articulation acentrically on the right  Scattered moderate spondylotic changes elsewhere  Workstation performed: [id number]     Thank you for allowing us to participate in this patient's care  Counseling / Coordination of Care  Total floor / unit time spent today 45 minutes  Greater than 50% of total time was spent with the patient and / or family counseling and / or coordination of care    A description of the counseling / coordination of care: Review of history, current assessment, development of a plan  Code Status: Level 1 - Full Code    ** Please Note: Dragon 360 Dictation voice to text software may have been used in the creation of this document   **

## 2018-11-19 NOTE — ED NOTES
Dr Gordon Mcclain notified of pt's PTT value at this time and notified of repeat PTT drawn 2 minutes post heparin bolus on the opposite arm  Instructed to follow heparin protocol for pt's PTT value as of this time        Maricruz Kern RN  11/19/18 4302

## 2018-11-19 NOTE — H&P
H&P- Rodrigo Oas 10/10/1927, 80 y o  female MRN: [id number]    Unit/Bed#: [facility] Encounter: [id number]    Primary Care Provider: Nara Roque MD   Date and time admitted to hospital: 11/19/2018  9:50 AM        * Near syncope with unwitnessed fall   Assessment & Plan    Patient denies any fall, but per EMS, patient was found on the floor in the pool of feces and was conscious  It is unclear if patient had syncopal event or seizure like activity  Near syncopal event vs syncope related to dehydration vs hypertensive encephalopathy vs NSTEMI vs infection (less likely)? CT head shows No acute intracranial abnormality with some microangiopathic changes  CT C-spine shows no acute fracture  PLAN:  · Monitor on telemetry  · IV hydration  · Trend troponin  · Check echocardiogram  · Check UA  · Check VBG  · Check blood cultures,  · Check UDS  · Check orthostatic vitals  · PT OT evaluation  · Check EEG  Encephalopathy   Assessment & Plan    Although she is AO x3, she is intermittently confused about her presentation and situation  Possibly related to uncontrolled hypertension versus dehydration  Less likely acute CVA as no focal deficits noted  She does not have any history of seizures, and this event was not witnessed  PLAN:  · Controlled hypertension  · Neuro checks q 4 hours  · Check EEG  · Check UDS   · IV fluids  · Hold off further brain imaging for now  NSTEMI (non-ST elevated myocardial infarction) [facility])   Assessment & Plan    Minimal troponin leak with high CK MB without any symptoms consistent with angina  EKG is not consistent with ischemic changes  Possibly Type 2  Started on intravenous heparin in the ER  PLAN:  ·  Trend troponin  · Check echocardiogram   · Continue intravenous heparin for now  · Cardiology evaluation  · Blood pressure control as below        Hypertensive crisis   Assessment & Plan    Likely related to noncompliance of medications    She is on Quinapril at home  As per her, recently home blood pressure has been running high in 150s at home  Now with QUIANA and encephalopathy  Blood pressure down to 178 in the ER with 5 mg Lopressor  CT head negative for any intracranial bleed  PLAN:  · P r n  Labetalol for systolic blood pressure more than 180  · Goal blood pressure today between 160-180  · Will hold off on quinapril for now given QUIANA  · Start amlodipine p o  Hypercalcemia   Assessment & Plan    Mild hypercalcemia Likely related to dehydration  Give IV fluids and trend BMP  Acute kidney failure [facility])   Assessment & Plan    Likely related to prerenal azotemia based on BUN creatinine ratio  PLAN:  · IV hydration  · Trend BMP  · If no improvement or worsening renal function, check a retroperitoneal ultrasound  · Monitor I&Os  Polycythemia   Assessment & Plan    Has history of polycythemia with baseline hemoglobin of 16- 16 5 with low to normal erythropoietin  Not on any treatment for now  Hemoglobin is 18 , likely increased due to dehydration  PLAN:  -IV hydration   -Trend CBC for now  Leukocytosis   Assessment & Plan    Likely reactive related to dehydration  Rule out acute infection  PLAN:  · IV hydration  · Infection workup with UA and blood cultures  Noted negative chest x-ray for pneumonia  · Monitor for fever  · Trend CBC  VTE Prophylaxis: Heparin Drip  / sequential compression device   Code Status: FULL  POLST: POLST is not applicable to this patient    Anticipated Length of Stay:  Patient will be admitted on an Inpatient basis with an anticipated length of stay of  more than 2 midnights  Justification for Hospital Stay:  Ongoing evaluation    Total Time for Visit, including Counseling / Coordination of Care: 45 minutes  Greater than 50% of this total time spent on direct patient counseling and coordination of care      Chief Complaint:   Near syncope an unwitnessed fall    History of Present Illness:    El Sawyer is a 80 y o  female with medical history of hypertension, hyperlipidemia and hypothyroidism was brought in by EMS for unwitnessed fall and possible near-syncopal event  Patient is a poor historian  As per ER staff, patient lives independently  And her neighbors noted that newspapers was stacking for 3 days and the got concerned about her which prompted them to call EMS  On EMS arrival, patient was found on the floor with unkempt house and the cat feces in apartment  Patient was conscious but was not able to get up by herself due to generalized weakness, s she was brought to ER,    As per patient, she was in her bed and was normal prior to going to bed last night  She says that she felt too weak to get of of bed  When I asked her about newspaper outside her apartment, she mentioned that she did not take them from door as she did not want to read newspaper  She reports extreme generalized weakness prior to coming to ER  She reports poor PO intake  She says she does not drink enough water, and eats only frozen food which she gets from store but she has not been eating well lately  She also reports some urinary frequency  Per patient, her Bp has been running high lately with systolic BP in 289B  She Denied any syncopal event  She denied any chest pain, lightheadedness, nausea, vomiting, seizure-like activity at home or in ER  Review of Systems:    Review of Systems   Constitutional: Positive for appetite change and fatigue  Negative for chills, diaphoresis and fever  HENT: Negative for congestion, ear discharge, sinus pain and sore throat  Eyes: Negative for visual disturbance  Respiratory: Negative for cough, chest tightness and shortness of breath  Cardiovascular: Negative for chest pain, palpitations and leg swelling  Gastrointestinal: Negative for abdominal pain, constipation, diarrhea, nausea and vomiting  Genitourinary: Positive for frequency   Negative for dysuria, flank pain and urgency  Musculoskeletal: Negative for back pain and neck pain  Neurological: Negative for dizziness, tremors, seizures, syncope, light-headedness, numbness and headaches  Past Medical and Surgical History:     Past Medical History:   Diagnosis Date    Hypertension        History reviewed  No pertinent surgical history  Meds/Allergies:    Prior to Admission medications    Medication Sig Start Date End Date Taking? Authorizing Provider   bimatoprost (LUMIGAN) 0 01 % ophthalmic drops Administer 1 drop to both eyes daily at bedtime   Yes Historical Provider, MD   levothyroxine 25 mcg tablet Take 25 mcg by mouth daily   Yes Historical Provider, MD   quinapril (ACCUPRIL) 20 mg tablet Take 20 mg by mouth daily at bedtime   Yes Historical Provider, MD   solifenacin (VESICARE) 5 mg tablet Take 5 mg by mouth daily   Yes Historical Provider, MD     I have reviewed home medications with a medical source (PCP, Pharmacy, other)  Allergies: No Known Allergies    Social History:     Marital Status:      Substance Use History:   History   Alcohol Use    Yes     History   Smoking Status    Former Smoker    Types: Cigarettes   Smokeless Tobacco    Never Used     History   Drug Use No       Family History:    non-contributory    Physical Exam:     Vitals:   Blood Pressure: (!) 171/113 (11/19/18 1245)  Pulse: 67 (11/19/18 1245)  Temperature: 97 6 °F (36 4 °C) (11/19/18 1006)  Temp Source: Rectal (11/19/18 1006)  Respirations: (!) 26 (11/19/18 1245)  Weight - Scale: 56 2 kg (124 lb) (11/19/18 1006)  SpO2: 95 % (11/19/18 1245)    Physical Exam   Constitutional: She is oriented to person, place, and time  No distress  HENT:   Bruise over forehead (as per patient, this is chronic,   Eyes: Pupils are equal, round, and reactive to light  Neck: No JVD present  Cardiovascular: Normal rate, regular rhythm, normal heart sounds and intact distal pulses      No murmur heard   Pulmonary/Chest: Breath sounds normal  No respiratory distress  She has no wheezes  She has no rales  Abdominal: Soft  Bowel sounds are normal  She exhibits no distension  There is no tenderness  There is no rebound and no guarding  Musculoskeletal: She exhibits no edema  Neurological: She is alert and oriented to person, place, and time  Bilateral upper and lower extremity motor strength equal and normal   No facial droop or dysarthria  Skin: Skin is warm  Additional Data:     Lab Results: I have personally reviewed pertinent reports  Results from last 7 days  Lab Units 11/19/18  1010   WBC Thousand/uL 16 45*   HEMOGLOBIN g/dL 18 8*   HEMATOCRIT % 57 7*   PLATELETS Thousands/uL 198   NEUTROS PCT % 73   LYMPHS PCT % 16   MONOS PCT % 9   EOS PCT % 1       Results from last 7 days  Lab Units 11/19/18  1010   SODIUM mmol/L 142   POTASSIUM mmol/L 4 4   CHLORIDE mmol/L 109*   CO2 mmol/L 24   BUN mg/dL 60*   CREATININE mg/dL 1 04   ANION GAP mmol/L 9   CALCIUM mg/dL 10 8*   ALBUMIN g/dL 4 0   TOTAL BILIRUBIN mg/dL 1 33*   ALK PHOS U/L 111   ALT U/L 56   AST U/L 53*   GLUCOSE RANDOM mg/dL 117       Results from last 7 days  Lab Units 11/19/18  1139   INR  1 25*                   Imaging: I have personally reviewed pertinent reports  CT cervical spine without contrast   Final Result by Rm Irwin MD (11/19 1109)         1  No acute fracture  2   Mild grade 1 anterolisthesis of C2 on C3 may be spondylotic in etiology  3   Advanced degenerative changes anteriorly at the C1-C2 articulation acentrically on the right  Scattered moderate spondylotic changes elsewhere  Workstation performed: [id number]         CT head without contrast   Final Result by Rm Irwin MD (11/19 1100)      No acute intracranial abnormality  Microangiopathic changes                    Workstation performed: [id number]         XR chest 2 views   Final Result by Estelita Lui DO (11/19 1104)   No acute cardiopulmonary disease  Workstation performed: [id number]             EKG, Pathology, and Other Studies Reviewed on Admission:   · EKG: results reviewed    Allscripts / Epic Records Reviewed: Yes     ** Please Note: This note has been constructed using a voice recognition system   **

## 2018-11-19 NOTE — ED PROVIDER NOTES
History  Chief Complaint   Patient presents with    Multiple Falls     Per EMS, esvin was concerned when 3 days worth of newspapers were outside patient's home  Patient found on floor with bruise to the center of forehead  Patient alert and oriented x4, but unsure of how she ended up on the floor  Patient denies head/neck/back pain  Patient is a 80year old female with a past medical history significant for HTN, CKD, polycythemia who presents by ambulance after neighbors called 911 because there was a pile of 3 days of newspapers by the door  Per EMS, the apartment had cat feces and piles of stuff everywhere  Patient reports she may have fallen, but keeps changing the story of what happened  Denies any complaints  Per daughter over the phone, she last heard from mother 5 days prior  None       Past Medical History:   Diagnosis Date    Hypertension        History reviewed  No pertinent surgical history  History reviewed  No pertinent family history  I have reviewed and agree with the history as documented  Social History   Substance Use Topics    Smoking status: Former Smoker     Types: Cigarettes    Smokeless tobacco: Never Used    Alcohol use Yes        Review of Systems   Constitutional: Negative for chills and fever  HENT: Negative for congestion and rhinorrhea  Eyes: Negative for photophobia and visual disturbance  Respiratory: Negative for chest tightness and shortness of breath  Cardiovascular: Negative for chest pain and palpitations  Gastrointestinal: Negative for abdominal pain, blood in stool, constipation, diarrhea, nausea and vomiting  Genitourinary: Negative for dysuria and hematuria  Musculoskeletal: Negative for back pain and neck pain  Skin: Negative for pallor and rash  Neurological: Negative for dizziness, light-headedness and headaches         Physical Exam  ED Triage Vitals [11/19/18 1006]   Temperature Pulse Respirations Blood Pressure SpO2 97 6 °F (36 4 °C) 77 18 (!) 243/111 95 %      Temp Source Heart Rate Source Patient Position - Orthostatic VS BP Location FiO2 (%)   Rectal Monitor Lying Left arm --      Pain Score       No Pain           Orthostatic Vital Signs  Vitals:    11/19/18 1030 11/19/18 1130 11/19/18 1144 11/19/18 1145   BP: (!) 238/102 (!) 216/93 (!) 225/95 (!) 178/83   Pulse: 86 82 65 57   Patient Position - Orthostatic VS: Lying  Lying        Physical Exam   Constitutional: She is oriented to person, place, and time  She appears well-developed  No distress  cachectic   HENT:   Head: Normocephalic  Head is without raccoon's eyes, without Soto's sign, without abrasion, without contusion and without laceration  Hair is normal        Right Ear: External ear normal    Left Ear: External ear normal    Nose: Nose normal    Mouth/Throat: Uvula is midline  Mucous membranes are dry  No trismus in the jaw  No uvula swelling  Eyes: Pupils are equal, round, and reactive to light  Conjunctivae and EOM are normal    Neck: Normal range of motion  Neck supple  Cardiovascular: Normal rate, regular rhythm, normal heart sounds and intact distal pulses  Exam reveals no gallop and no friction rub  No murmur heard  Pulmonary/Chest: Effort normal  No respiratory distress  She has no wheezes  She has no rales  She exhibits no tenderness  Diminished BS BL   Abdominal: Soft  Bowel sounds are normal  She exhibits no distension  There is no tenderness  There is no rebound and no guarding  Genitourinary:   Genitourinary Comments: Stage 2 sacral decubitus ulcer   Musculoskeletal: Normal range of motion  She exhibits no edema  No c-t-l-spine tenderness, step-offs, deformities  Pelvis stable   Neurological: She is alert and oriented to person, place, and time  GCS eye subscore is 4  GCS verbal subscore is 4  GCS motor subscore is 6  Moves all 4 extremities equally    Skin: Skin is warm and dry  Capillary refill takes less than 2 seconds   She is not diaphoretic  There is pallor  Nursing note and vitals reviewed  ED Medications  Medications   heparin (porcine) 25,000 units in 250 mL infusion (premix) (12 Units/kg/hr × 55 kg (Order-Specific) Intravenous New Bag 11/19/18 1141)   heparin (porcine) injection 3,300 Units (not administered)   heparin (porcine) injection 1,650 Units (not administered)   sodium chloride 0 9 % bolus 1,000 mL (0 mL Intravenous Stopped 11/19/18 1144)   heparin (porcine) injection 3,300 Units (3,300 Units Intravenous Given 11/19/18 1141)   aspirin chewable tablet 324 mg (324 mg Oral Given 11/19/18 1143)   metoprolol (LOPRESSOR) injection 5 mg (5 mg Intravenous Given 11/19/18 1135)       Diagnostic Studies  Results Reviewed     Procedure Component Value Units Date/Time    Protime-INR [[id number]] Collected:  11/19/18 1139    Lab Status: In process Specimen:  Blood from Arm, Right Updated:  11/19/18 1157    APTT [[id number]] Collected:  11/19/18 1139    Lab Status:   In process Specimen:  Blood from Arm, Right Updated:  11/19/18 1157    CKMB [[id number]]  (Abnormal) Collected:  11/19/18 1010    Lab Status:  Final result Specimen:  Blood from Arm, Right Updated:  11/19/18 1115     CK-MB Index 5 5 (H) %      CK-MB 21 4 (H) ng/mL     T4, free [[id number]]  (Normal) Collected:  11/19/18 1010    Lab Status:  Final result Specimen:  Blood from Arm, Right Updated:  11/19/18 1115     Free T4 0 93 ng/dL     APTT six (6) hours after Heparin bolus/drip initiation or dosing change [[id number]]     Lab Status:  No result Specimen:  Blood     Comprehensive metabolic panel [[id number]]  (Abnormal) Collected:  11/19/18 1010    Lab Status:  Final result Specimen:  Blood from Arm, Right Updated:  11/19/18 1058     Sodium 142 mmol/L      Potassium 4 4 mmol/L      Chloride 109 (H) mmol/L      CO2 24 mmol/L      ANION GAP 9 mmol/L      BUN 60 (H) mg/dL      Creatinine 1 04 mg/dL      Glucose 117 mg/dL      Calcium 10 8 (H) mg/dL      AST 53 (H) U/L      ALT 56 U/L      Alkaline Phosphatase 111 U/L      Total Protein 7 8 g/dL      Albumin 4 0 g/dL      Total Bilirubin 1 33 (H) mg/dL      eGFR 47 ml/min/1 73sq m     Narrative:         National Kidney Disease Education Program recommendations are as follows:  GFR calculation is accurate only with a steady state creatinine  Chronic Kidney disease less than 60 ml/min/1 73 sq  meters  Kidney failure less than 15 ml/min/1 73 sq  meters  TSH, 3rd generation with Free T4 reflex [[id number]]  (Abnormal) Collected:  11/19/18 1010    Lab Status:  Final result Specimen:  Blood from Arm, Right Updated:  11/19/18 1058     TSH 3RD GENERATON 11 600 (H) uIU/mL     Narrative:         Patients undergoing fluorescein dye angiography may retain small amounts of fluorescein in the body for 48-72 hours post procedure  Samples containing fluorescein can produce falsely depressed TSH values  If the patient had this procedure,a specimen should be resubmitted post fluorescein clearance            The recommended reference ranges for TSH during pregnancy are as follows:  First trimester 0 1 to 2 5 uIU/mL  Second trimester  0 2 to 3 0 uIU/mL  Third trimester 0 3 to 3 0 uIU/m      CK Total with Reflex CKMB [[id number]]  (Abnormal) Collected:  11/19/18 1010    Lab Status:  Final result Specimen:  Blood from Arm, Right Updated:  11/19/18 1058     Total  (H) U/L     Troponin I [[id number]]  (Abnormal) Collected:  11/19/18 1010    Lab Status:  Final result Specimen:  Blood from Arm, Right Updated:  11/19/18 1053     Troponin I 0 28 (H) ng/mL     CBC and differential [[id number]]  (Abnormal) Collected:  11/19/18 1010    Lab Status:  Final result Specimen:  Blood from Arm, Right Updated:  11/19/18 1027     WBC 16 45 (H) Thousand/uL      RBC 6 26 (H) Million/uL      Hemoglobin 18 8 (H) g/dL      Hematocrit 57 7 (H) %      MCV 92 fL      MCH 30 0 pg      MCHC 32 6 g/dL      RDW 14 3 %      MPV 12 6 fL      Platelets 957 Thousands/uL      nRBC 0 /100 WBCs      Neutrophils Relative 73 %      Immat GRANS % 1 %      Lymphocytes Relative 16 %      Monocytes Relative 9 %      Eosinophils Relative 1 %      Basophils Relative 0 %      Neutrophils Absolute 12 06 (H) Thousands/µL      Immature Grans Absolute 0 17 Thousand/uL      Lymphocytes Absolute 2 56 Thousands/µL      Monocytes Absolute 1 42 (H) Thousand/µL      Eosinophils Absolute 0 18 Thousand/µL      Basophils Absolute 0 06 Thousands/µL     POCT urinalysis dipstick [[id number]]     Lab Status:  No result Specimen:  Urine                  CT cervical spine without contrast   Final Result by Chucho Patel MD (11/19 1109)         1  No acute fracture  2   Mild grade 1 anterolisthesis of C2 on C3 may be spondylotic in etiology  3   Advanced degenerative changes anteriorly at the C1-C2 articulation acentrically on the right  Scattered moderate spondylotic changes elsewhere  Workstation performed: [id number]         CT head without contrast   Final Result by Chucho Patel MD (11/19 1104)      No acute intracranial abnormality  Microangiopathic changes  Workstation performed: [id number]         XR chest 2 views   Final Result by Joellen Rivera DO (11/19 1104)   No acute cardiopulmonary disease              Workstation performed: [id number]               Procedures  ECG 12 Lead Documentation  Date/Time: 11/19/2018 10:16 AM  Performed by: Sherren Dames by: Boneta People     Indications / Diagnosis:  Found down  ECG reviewed by me, the ED Provider: yes    Patient location:  ED  Previous ECG:     Previous ECG:  Compared to current    Comparison ECG info:  03/10/2013    Similarity:  No change  Interpretation:     Interpretation: non-specific    Rate:     ECG rate:  79    ECG rate assessment: normal    Rhythm:     Rhythm: sinus rhythm    Ectopy:     Ectopy: none    QRS:     QRS axis:  Left    QRS intervals:  Normal  Conduction:     Conduction: normal    ST segments:     ST segments:  Normal  T waves:     T waves: inverted      Inverted:  AVL          Phone Consults  ED Phone Contact    ED Course  ED Course as of Nov 19 1216   Mon Nov 19, 2018   1057 Troponin I: (!) 0 28   1058 Total CK: (!) 387   1059 QUIANA: baseline creatine is 0 6 from [facility] records  0 99 at [facility] in 07/2018  So questionable if this is truly an QUIANA Creatinine: 1 04   1059 TSH 3RD GENERATON: (!) 11 600   1120 hypothyroid Troponin I: (!) 0 28   1127 Spoke with Robinson Doss (daughter) [phone] and updated regarding current diagnoses  1136 Patient does not have a documented history of HTN  Will treat this as asymptomatic hypertensive urgency with 5 mg lopressor  Troponin elevation questionable secondary to end organ damage from the HTN  Blood Pressure: (!) 238/102                               MDM  Number of Diagnoses or Management Options  Diagnosis management comments: Assessment and Plan:   Found down- cachectic, clinically dry appearing, aaox3, but confused as to what happened over the last few days, ecchymosis over forehead, satge 2 sacral decubitus ulcer  Daughter called- per daughter who lives in [place], last heard from mother 5 days ago and son has been unable to reach mother x 1 week  Per EMS, the apartment is "in shambles" with cat feces all over the floor, piles of trash everywhere  Will check CT head to rule out intracranial hemorrhage, CT cervical spine to rule out fracture, chest x-ray to rule out pneumonia/the pneumothorax, urinalysis to rule out urinary tract infection  Labs to evaluate for anemia, leukocytosis, electrolyte abnormalities, EKG to rule out arrhythmia/STEMI, troponin to rule out NSTEMI, CK to rule out rhabdomyolysis  Will start IV fluids      CritCare Time    Disposition  Final diagnoses:   NSTEMI (non-ST elevated myocardial infarction) ([facility] [address] )   QUIANA (acute kidney injury) ([facility] [address] )   Fall, initial encounter   Minor head injury, initial encounter   Hypothyroidism Asymptomatic hypertensive urgency     Time reflects when diagnosis was documented in both MDM as applicable and the Disposition within this note     Time User Action Codes Description Comment    11/19/2018 11:11 AM Irma Gale Add [I21 4] NSTEMI (non-ST elevated myocardial infarction) ([facility] [address] )     11/19/2018 11:11 AM Irma Gale Add [N17 9] QUIANA (acute kidney injury) ([facility] [address] )     11/19/2018 11:11 AM Irma Gale Add Fabricia Pam  [facility]] Fall, initial encounter     11/19/2018 11:27 AM Irma Gale Add [Q71 04BQ] Minor head injury, initial encounter     11/19/2018 11:28 AM Irma Gale Add [E03 9] Hypothyroidism     11/19/2018 11:32 AM Irma Gale Add [I10] Hypertension     11/19/2018 11:36 AM Irma Gale Add [I16 0] Asymptomatic hypertensive urgency     11/19/2018 11:36 AM Irma Gale Remove [I10] Hypertension       ED Disposition     ED Disposition Condition Comment    Admit  Case was discussed with Dr Amber An and the patient's admission status was agreed to be Admission Status: inpatient status to the service of Dr Amber An   Follow-up Information    None         Patient's Medications    No medications on file     No discharge procedures on file  ED Provider  Attending physically available and evaluated Janeth Jauregui I managed the patient along with the ED Attending      Electronically Signed by         DO Kimo  11/19/18 4828

## 2018-11-19 NOTE — ASSESSMENT & PLAN NOTE
Although she is AO x3, she is intermittently confused about her presentation and situation  Possibly related to uncontrolled hypertension versus dehydration  Less likely acute CVA as no focal deficits noted  She does not have any history of seizures, and this event was not witnessed  PLAN:  · Controlled hypertension  · Neuro checks q 4 hours  · Check EEG  · Check UDS   · IV fluids  · Hold off further brain imaging for now

## 2018-11-19 NOTE — ASSESSMENT & PLAN NOTE
Likely reactive related to dehydration  Rule out acute infection  PLAN:  · IV hydration  · Infection workup with UA and blood cultures  Noted negative chest x-ray for pneumonia  · Monitor for fever  · Trend CBC

## 2018-11-19 NOTE — ASSESSMENT & PLAN NOTE
Likely related to prerenal azotemia based on BUN creatinine ratio  PLAN:  · IV hydration  · Trend BMP  · If no improvement or worsening renal function, check a retroperitoneal ultrasound  · Monitor I&Os

## 2018-11-19 NOTE — ED NOTES
At this time noticed PTT and PT/INR sent to lab on previous shift was hemolyzed  Repeat lab drawn from pt's R arm post heparin bolus        Tyrone Blount RN  11/19/18 9861

## 2018-11-19 NOTE — ED NOTES
Spoke to RN on P4 regarding pt's heparin drip at this time        Ryan Cole, MAURICIO  11/19/18 4371

## 2018-11-19 NOTE — ED NOTES
Pt transported to the floor on the monitor by ED MAURICIO Delcid and ED Aftab Daniel RN  11/19/18 4375

## 2018-11-19 NOTE — ASSESSMENT & PLAN NOTE
Minimal troponin leak with high CK MB without any symptoms consistent with angina  EKG is not consistent with ischemic changes  Possibly Type 2  Started on intravenous heparin in the ER  PLAN:  ·  Trend troponin  · Check echocardiogram   · Continue intravenous heparin for now  · Cardiology evaluation  · Blood pressure control as below  Lajean Cassette

## 2018-11-19 NOTE — ED ATTENDING ATTESTATION
Walker Kearns MD, saw and evaluated the patient  I have discussed the patient with the resident/non-physician practitioner and agree with the resident's/non-physician practitioner's findings, Plan of Care, and MDM as documented in the resident's/non-physician practitioner's note, except where noted  All available labs and Radiology studies were reviewed  At this point I agree with the current assessment done in the Emergency Department  I have conducted an independent evaluation of this patient a history and physical is as follows:      Critical Care Time  CritCare Time    Procedures     79 yo female from independent living, not seen for three days and found down on ground  Pt unable to give hx  Pt noted to be laying in feces and unkempt apartment  Pt with no external signs of trauma, but has decubitus ulcer  Pt with hx of ckd, polycythemia, htn, hld, hypothyroidism  Vss, afebrile, lungs cta, rrr, abdomen sof tnontender, no focal neuro deficits, stage 2 decubitus ulcer  Cardiac workup, urine, cxr, ct head, cspine, tsh, ck

## 2018-11-20 ENCOUNTER — APPOINTMENT (INPATIENT)
Dept: NON INVASIVE DIAGNOSTICS | Facility: HOSPITAL | Age: 83
DRG: 280 | End: 2018-11-20
Payer: MEDICARE

## 2018-11-20 LAB
ALBUMIN SERPL BCP-MCNC: 2.8 G/DL (ref 3.5–5)
ALP SERPL-CCNC: 74 U/L (ref 46–116)
ALT SERPL W P-5'-P-CCNC: 39 U/L (ref 12–78)
ANION GAP SERPL CALCULATED.3IONS-SCNC: 7 MMOL/L (ref 4–13)
AST SERPL W P-5'-P-CCNC: 30 U/L (ref 5–45)
BACTERIA UR QL AUTO: ABNORMAL /HPF
BASOPHILS # BLD AUTO: 0.04 THOUSANDS/ΜL (ref 0–0.1)
BASOPHILS NFR BLD AUTO: 0 % (ref 0–1)
BILIRUB SERPL-MCNC: 1.26 MG/DL (ref 0.2–1)
BILIRUB UR QL STRIP: ABNORMAL
BUN SERPL-MCNC: 48 MG/DL (ref 5–25)
CALCIUM SERPL-MCNC: 9.1 MG/DL (ref 8.3–10.1)
CHLORIDE SERPL-SCNC: 115 MMOL/L (ref 100–108)
CK MB SERPL-MCNC: 5.8 % (ref 0–2.5)
CK MB SERPL-MCNC: 8.5 NG/ML (ref 0–5)
CK SERPL-CCNC: 147 U/L (ref 26–192)
CLARITY UR: ABNORMAL
CO2 SERPL-SCNC: 23 MMOL/L (ref 21–32)
COLOR UR: ABNORMAL
CREAT SERPL-MCNC: 0.69 MG/DL (ref 0.6–1.3)
EOSINOPHIL # BLD AUTO: 0.43 THOUSAND/ΜL (ref 0–0.61)
EOSINOPHIL NFR BLD AUTO: 3 % (ref 0–6)
ERYTHROCYTE [DISTWIDTH] IN BLOOD BY AUTOMATED COUNT: 13.9 % (ref 11.6–15.1)
GFR SERPL CREATININE-BSD FRML MDRD: 76 ML/MIN/1.73SQ M
GLUCOSE SERPL-MCNC: 93 MG/DL (ref 65–140)
GLUCOSE UR STRIP-MCNC: NEGATIVE MG/DL
HCT VFR BLD AUTO: 45 % (ref 34.8–46.1)
HGB BLD-MCNC: 14.5 G/DL (ref 11.5–15.4)
HGB UR QL STRIP.AUTO: ABNORMAL
IMM GRANULOCYTES # BLD AUTO: 0.09 THOUSAND/UL (ref 0–0.2)
IMM GRANULOCYTES NFR BLD AUTO: 1 % (ref 0–2)
KETONES UR STRIP-MCNC: ABNORMAL MG/DL
LEUKOCYTE ESTERASE UR QL STRIP: ABNORMAL
LYMPHOCYTES # BLD AUTO: 2.42 THOUSANDS/ΜL (ref 0.6–4.47)
LYMPHOCYTES NFR BLD AUTO: 19 % (ref 14–44)
MAGNESIUM SERPL-MCNC: 2.2 MG/DL (ref 1.6–2.6)
MCH RBC QN AUTO: 30 PG (ref 26.8–34.3)
MCHC RBC AUTO-ENTMCNC: 32.2 G/DL (ref 31.4–37.4)
MCV RBC AUTO: 93 FL (ref 82–98)
MONOCYTES # BLD AUTO: 1.42 THOUSAND/ΜL (ref 0.17–1.22)
MONOCYTES NFR BLD AUTO: 11 % (ref 4–12)
NEUTROPHILS # BLD AUTO: 8.4 THOUSANDS/ΜL (ref 1.85–7.62)
NEUTS SEG NFR BLD AUTO: 66 % (ref 43–75)
NITRITE UR QL STRIP: NEGATIVE
NON-SQ EPI CELLS URNS QL MICRO: ABNORMAL /HPF
NRBC BLD AUTO-RTO: 0 /100 WBCS
PH UR STRIP.AUTO: 8 [PH] (ref 4.5–8)
PHOSPHATE SERPL-MCNC: 1.9 MG/DL (ref 2.3–4.1)
PLATELET # BLD AUTO: 154 THOUSANDS/UL (ref 149–390)
PMV BLD AUTO: 12.7 FL (ref 8.9–12.7)
POTASSIUM SERPL-SCNC: 3.9 MMOL/L (ref 3.5–5.3)
PROT SERPL-MCNC: 5.6 G/DL (ref 6.4–8.2)
PROT UR STRIP-MCNC: ABNORMAL MG/DL
RBC # BLD AUTO: 4.83 MILLION/UL (ref 3.81–5.12)
RBC #/AREA URNS AUTO: ABNORMAL /HPF
SODIUM SERPL-SCNC: 145 MMOL/L (ref 136–145)
SP GR UR STRIP.AUTO: 1.02 (ref 1–1.03)
TRI-PHOS CRY URNS QL MICRO: ABNORMAL /HPF
UROBILINOGEN UR QL STRIP.AUTO: 1 E.U./DL
WBC # BLD AUTO: 12.8 THOUSAND/UL (ref 4.31–10.16)
WBC #/AREA URNS AUTO: ABNORMAL /HPF

## 2018-11-20 PROCEDURE — 93321 DOPPLER ECHO F-UP/LMTD STD: CPT | Performed by: INTERNAL MEDICINE

## 2018-11-20 PROCEDURE — G8988 SELF CARE GOAL STATUS: HCPCS

## 2018-11-20 PROCEDURE — 85025 COMPLETE CBC W/AUTO DIFF WBC: CPT | Performed by: INTERNAL MEDICINE

## 2018-11-20 PROCEDURE — 93325 DOPPLER ECHO COLOR FLOW MAPG: CPT | Performed by: INTERNAL MEDICINE

## 2018-11-20 PROCEDURE — 97167 OT EVAL HIGH COMPLEX 60 MIN: CPT

## 2018-11-20 PROCEDURE — 99232 SBSQ HOSP IP/OBS MODERATE 35: CPT | Performed by: INTERNAL MEDICINE

## 2018-11-20 PROCEDURE — G8978 MOBILITY CURRENT STATUS: HCPCS

## 2018-11-20 PROCEDURE — 83735 ASSAY OF MAGNESIUM: CPT | Performed by: INTERNAL MEDICINE

## 2018-11-20 PROCEDURE — G8987 SELF CARE CURRENT STATUS: HCPCS

## 2018-11-20 PROCEDURE — 93308 TTE F-UP OR LMTD: CPT | Performed by: INTERNAL MEDICINE

## 2018-11-20 PROCEDURE — 97163 PT EVAL HIGH COMPLEX 45 MIN: CPT

## 2018-11-20 PROCEDURE — 84100 ASSAY OF PHOSPHORUS: CPT | Performed by: INTERNAL MEDICINE

## 2018-11-20 PROCEDURE — G8979 MOBILITY GOAL STATUS: HCPCS

## 2018-11-20 PROCEDURE — 82550 ASSAY OF CK (CPK): CPT | Performed by: INTERNAL MEDICINE

## 2018-11-20 PROCEDURE — 99222 1ST HOSP IP/OBS MODERATE 55: CPT | Performed by: FAMILY MEDICINE

## 2018-11-20 PROCEDURE — 80053 COMPREHEN METABOLIC PANEL: CPT | Performed by: INTERNAL MEDICINE

## 2018-11-20 PROCEDURE — 82553 CREATINE MB FRACTION: CPT | Performed by: INTERNAL MEDICINE

## 2018-11-20 PROCEDURE — 93306 TTE W/DOPPLER COMPLETE: CPT

## 2018-11-20 RX ORDER — SODIUM CHLORIDE 9 MG/ML
60 INJECTION, SOLUTION INTRAVENOUS CONTINUOUS
Status: DISCONTINUED | OUTPATIENT
Start: 2018-11-20 | End: 2018-11-21

## 2018-11-20 RX ORDER — MELATONIN
1000 DAILY
Status: DISCONTINUED | OUTPATIENT
Start: 2018-11-21 | End: 2018-11-24 | Stop reason: HOSPADM

## 2018-11-20 RX ORDER — ACETAMINOPHEN 325 MG/1
650 TABLET ORAL EVERY 8 HOURS SCHEDULED
Status: DISCONTINUED | OUTPATIENT
Start: 2018-11-20 | End: 2018-11-24 | Stop reason: HOSPADM

## 2018-11-20 RX ORDER — LISINOPRIL 20 MG/1
20 TABLET ORAL DAILY
Status: DISCONTINUED | OUTPATIENT
Start: 2018-11-20 | End: 2018-11-24 | Stop reason: HOSPADM

## 2018-11-20 RX ADMIN — SODIUM CHLORIDE 60 ML/HR: 0.9 INJECTION, SOLUTION INTRAVENOUS at 10:35

## 2018-11-20 RX ADMIN — DIBASIC SODIUM PHOSPHATE, MONOBASIC POTASSIUM PHOSPHATE AND MONOBASIC SODIUM PHOSPHATE 1 TABLET: 852; 155; 130 TABLET ORAL at 15:55

## 2018-11-20 RX ADMIN — ACETAMINOPHEN 650 MG: 325 TABLET ORAL at 22:42

## 2018-11-20 RX ADMIN — CARVEDILOL 3.12 MG: 3.12 TABLET, FILM COATED ORAL at 07:48

## 2018-11-20 RX ADMIN — CARVEDILOL 3.12 MG: 3.12 TABLET, FILM COATED ORAL at 15:55

## 2018-11-20 RX ADMIN — OXYBUTYNIN CHLORIDE 5 MG: 5 TABLET, EXTENDED RELEASE ORAL at 09:35

## 2018-11-20 RX ADMIN — AMLODIPINE BESYLATE 5 MG: 5 TABLET ORAL at 09:35

## 2018-11-20 RX ADMIN — LEVOTHYROXINE SODIUM 25 MCG: 25 TABLET ORAL at 06:06

## 2018-11-20 RX ADMIN — LISINOPRIL 20 MG: 20 TABLET ORAL at 15:55

## 2018-11-20 RX ADMIN — ASPIRIN 81 MG: 81 TABLET, COATED ORAL at 09:35

## 2018-11-20 RX ADMIN — BIMATOPROST 1 DROP: 0.1 SOLUTION/ DROPS OPHTHALMIC at 22:42

## 2018-11-20 NOTE — PROGRESS NOTES
Pt refusing morning medication at this time and yelling expletives at staff  Will check back later about medications and continue to monitor

## 2018-11-20 NOTE — SOCIAL WORK
79yo female admitted yesterday after being found down at home, possibly for 4 days  Raúl called 911 when 3 newspapers were at door  Pt  Is oriented x3 but not to situation  She does not remember falling  She has large bruise in the middle of her forehead  Pt  Lita altamirano she lives alone in second floor apt with elevator, is independent and drives  She appears to have poor hygiene and very thin  At this time she is incontinent of urine and will not allow staff to clean her  She also is refusing medications and was yelling earlier  Pt  Lita altamirano she has few friends in her apt  Building and would be interested in an independent living facility  She has been to [facility] in the past for rehab, but not sure when  She would be willing to go back there for rehab  Pt  Has son and daughter  CM does not have daughter's info and pt states she lives in [place]  CM attempted to contact pt's son Sariah Anderson, at [phone], and left VM to contact me  Pt  States son lives in [place] and will be coming but doesn't know when  Her PCP is Dr Cesario Hope and she thinks that she uses [facility] but is not sure  CM will make referral to [facility] rehab to begin possible discharge plan  CM will follow up with son

## 2018-11-20 NOTE — PHYSICAL THERAPY NOTE
PHYSICAL THERAPY NOTE          Patient Name: Felice Nichols  JYHGE'G Date: 11/20/2018 11/20/18 3104   Note Type   Note type Eval only   Pain Assessment   Pain Assessment No/denies pain   Pain Score No Pain   Home Living   Type of Home Apartment  (pt reports living on 2nd floor)   Home Layout One level;Elevator  ( )   Home Equipment Cane   Additional Comments pt reports that she takes the stairs (approx 2 full flight) to "stay active"   Prior Function   Level of [place] Independent with ADLs and functional mobility  (reported prior use of SPC)   Lives With [facility], [address] in the last 6 months 0   Vocational Retired   Comments pt  reported that she does not ask for help from anyone   Restrictions/Precautions   Braces or Orthoses (no prior use reported)   Other Precautions Cardiac/sternal;Chair Alarm;Multiple lines;Telemetry; Fall Risk;Hard of hearing;Cognitive  (Chair alarm activated )   General   Additional Pertinent History cleared for assessment (spoke to nsg)   Family/Caregiver Present No   Cognition   Overall Cognitive Status WFL   Arousal/Participation Alert   Orientation Level Oriented to person;Oriented to place;Oriented to situation   Memory Other (Comment)  (pt appears as poor historian)   Following Commands Follows one step commands with increased time or repetition   Comments pt observed in chair; pt reports that she is (I) in all forms of mobility however she does use a SPC to assist in amb when needed; pt agreeable to mobility assessment however declines use of RW at this time   RUE Assessment   RUE Assessment WFL  (AROM)   LUE Assessment   LUE Assessment WFL  (AROM)   RLE Assessment   RLE Assessment WFL  (AROM)   Strength RLE   RLE Overall Strength (Fair/Fair+ (grossly observed through ambulation) )   LLE Assessment   LLE Assessment WFL  (AROM)   Strength LLE   LLE Overall Strength (Fair/Fair+ (grossly observed through ambulation))   Bed Mobility   Additional Comments Pt  returned to reclined position in chair, SCD's on, and chair alarm activated   Transfers   Sit to Stand 3  Moderate assistance   Additional items Assist x 1;Verbal cues   Stand to Sit 3  Moderate assistance   Additional items Assist x 1;Verbal cues   Ambulation/Elevation   Gait pattern Foward flexed; Inconsistent osmin; Short stride; Excessively slow;Narrow SHAWN   Gait Assistance 3  Moderate assist   Additional items Assist x 1;Verbal cues; Tactile cues  (standby A of second for line management)   Assistive Device Other (Comment)  (pt  declined use of AD; hand hold assist was used)   Distance 61'   Stair Management Assistance Not tested  (not appropriate at this time)   Balance   Static Sitting Fair   Static Standing Poor +   Ambulatory Poor   Endurance Deficit   Endurance Deficit Yes   Activity Tolerance   Activity Tolerance Patient tolerated treatment well   Nurse Made Aware PT spoke with Lila Zuniga RN; PT spoke with Ger Veronica RN   Assessment   Prognosis Guarded   Problem List Decreased strength;Decreased endurance; Impaired balance;Decreased mobility; Decreased cognition;Decreased safety awareness; Impaired hearing; Impaired judgement   Assessment Pt is a 80 y o  female who reported to the ED after a fall at home that she cannot remember  Pt Dx near syncope w/ unwitnessed fall  Comorbidities include: HTN; Personal factors include: living alone, steps inside of building, advanced age, and decreased caregiver support  Pt clinical presentation is noted as unpredictable/unstable as noted by ongoing telemetry monitoring and abnormal labs being monitored  Pt demonstrated generalized overall weakness, decreased functional strength, decreased functional endurance, decrease of ambulatory balance requiring use of hand hold assist at this time (will introduce RW next visit), and an inconsistent gait pattern   Pt was willing to participate in PT mobility assessment but declined the use of an AD  PT assisted pt w/ amb through hand hold assist  Pt was noted to have decreased ambulatory balance prompting the PT to ask pt to hold on to the DASH handle for increased stability and safety  Pt was able to complete amb trial of 60' and was returned to her chair  Will continue to follow pt with PT while in hospital to continue progression towards pre morbid functional ambulation  At this time anticipate that pt will require rehab upon DC to further progression of functional mobility  Barriers to Discharge Inaccessible home environment;Decreased caregiver support   Goals   Patient Goals To return home   STG Expiration Date 11/30/18   Short Term Goal #1 7-10 days  Pt will increase static standing balance by 1 grade to improve ambulatory balance to return to premorbid level of function  Pt will amb 150' w/ least restrictive AD, Mod (I) to return to pre morbid functional level and community amb status  Pt will achieve (I) level with bed mobility to decrease burden of care at home  Pt will perform transfers Mod (I) to assure independence and safety with functional mobility/transfers w/ all aspects of mobility/locomotion  Pt will participate in LE therex and balance training to continue to maximize progression of functional mobility and transfer skills  Treatment Day 0   Plan   Treatment/Interventions Functional transfer training;LE strengthening/ROM; Therapeutic exercise; Endurance training;Patient/family training;Equipment eval/education; Bed mobility;Gait training;Spoke to nursing;Spoke to case management   PT Frequency Other (Comment)  (3-5x/wk)   Recommendation   Recommendation Post acute IP rehab   Equipment Recommended (r/o RW next visit)   Modified Bennett Scale   Modified Monterey Scale 4   Barthel Index   Feeding 10   Bathing 0   Grooming Score 0   Dressing Score 10   Bladder Score 5   Bowels Score 10   Toilet Use Score 5   Transfers (Bed/Chair) Score 5   Mobility (Level Surface) Score 0   Stairs Score 0   Barthel Index Score 45     Ethwilla Lianna, SPT

## 2018-11-20 NOTE — UTILIZATION REVIEW
Initial Clinical Review    Admission: Date/Time/Statement: 11/19/18 @ 1130     Orders Placed This Encounter   Procedures    Inpatient Admission (expected length of stay for this patient is greater than two midnights)     Standing Status:   Standing     Number of Occurrences:   1     Order Specific Question:   Admitting Physician     Answer:   Ninfa Osborn [[id number]]     Order Specific Question:   Level of Care     Answer:   Med Surg [[id number]]     Order Specific Question:   Estimated length of stay     Answer:   More than 2 Midnights     Order Specific Question:   Certification     Answer:   I certify that inpatient services are medically necessary for this patient for a duration of greater than two midnights  See H&P and MD Progress Notes for additional information about the patient's course of treatment  ED: Date/Time/Mode of Arrival:   ED Arrival Information     Expected Arrival Acuity Means of Arrival Escorted By Service Admission Type    - 11/19/2018 09:49 Emergent Ambulance [facility] Hospitalist Emergency    Arrival Complaint    change in mental status        Chief Complaint:   Chief Complaint   Patient presents with    Multiple Falls     Per EMS, esvin was concerned when 3 days worth of newspapers were outside patient's home  Patient found on floor with bruise to the center of forehead  Patient alert and oriented x4, but unsure of how she ended up on the floor  Patient denies head/neck/back pain  History of Illness: Janeth Jauregui is a 80 y o  female with medical history of hypertension, hyperlipidemia and hypothyroidism was brought in by EMS for unwitnessed fall and possible near-syncopal event  Patient is a poor historian  As per ER staff, patient lives independently  And her neighbors noted that newspapers was stacking for 3 days and the got concerned about her which prompted them to call EMS    On EMS arrival, patient was found on the floor with unkempt house and the cat feces in apartment  Patient was conscious but was not able to get up by herself due to generalized weakness, s she was brought to ER,     As per patient, she was in her bed and was normal prior to going to bed last night  She says that she felt too weak to get of of bed  When I asked her about newspaper outside her apartment, she mentioned that she did not take them from door as she did not want to read newspaper  She reports extreme generalized weakness prior to coming to ER  She reports poor PO intake  She says she does not drink enough water, and eats only frozen food which she gets from store but she has not been eating well lately  She also reports some urinary frequency      Per patient, her Bp has been running high lately with systolic BP in 819T  She Denied any syncopal event   She denied any chest pain, lightheadedness, nausea, vomiting, seizure-like activity at home or in ER      ED Vital Signs:   ED Triage Vitals [11/19/18 1006]   Temperature Pulse Respirations Blood Pressure SpO2   97 6 °F (36 4 °C) 77 18 (!) 243/111 95 %      Temp Source Heart Rate Source Patient Position - Orthostatic VS BP Location FiO2 (%)   Rectal Monitor Lying Left arm --      Pain Score       No Pain        Wt Readings from Last 1 Encounters:   11/19/18 56 2 kg (124 lb)     Vital Signs (abnormal):   11/19/18 1651  --  89  --   182/69  --  --  --   11/19/18 1546  98 °F (36 7 °C)  85  21   191/82  --  95 %  None (Room air)   11/19/18 1509  --  --  --   227/89  --  --  --   11/19/18 1457   97 4 °F (36 3 °C)  67  18  --  --  95 %  None (Room air)   11/19/18 1422  --  --  --   222/88  --  --  --   11/19/18 1245  --  67   26   171/113  --  95 %  None (Room air)   11/19/18 1145  --  57   24   178/83  --  95 %  None (Room air)   11/19/18 1144  --  65  20   225/95  --  95 %  None (Room air)   11/19/18 1130  --  82   28   216/93  --  94 %  None (Room air)   11/19/18 1030  --  86  20   238/102  --  97 %  None (Room air)   11/19/18 1006  97 6 °F (36 4 °C)  77  18   243/111  --  95 %  None (Room air)     Abnormal Labs:    Cl 109  BUN 60  Lb 10 8  AST 53  Total bili 1 33  CK MB index 5 5  CK MB 21 4  Total   Trop 0 26, 0 27 x 2, 0 24  WBC 16 45  H/H  18 8/57 7  PTT >210  TSH 11 600   11/19/18 2240   Clarity, UA Turbid    Color, UA Dk Yellow    Specific Gravity, UA 1 022    pH, UA 8 0    Glucose, UA Negative    Ketones, UA Trace     Blood, UA Moderate     Protein,  (2+)     Nitrite, UA Negative    Bilirubin, UA Interference- unable to analyze     Urobilinogen, UA 1 0    Leukocytes, UA Large     WBC, UA 4-10     RBC, UA 2-4     Bacteria, UA Moderate     Triplep Phos Joi, UA Occasional    Epithelial Cells Occasional      Diagnostic Test Results:     11/19 CT cerical spine - 1  No acute fracture  2   Mild grade 1 anterolisthesis of C2 on C3 may be spondylotic in etiology  3   Advanced degenerative changes anteriorly at the C1-C2 articulation acentrically on the right  Scattered moderate spondylotic changes elsewhere      11/19 ECG - Normal sinus rhythm with sinus arrhythmia  Left axis deviation  Left ventricular hypertrophy with repolarization abnormality  Cannot rule out Septal infarct (cited on or before 10-MAR-2013)  Abnormal ECG  When compared with ECG of 10-MAR-2013 07:47,  Vent   rate has decreased BY  56 BPM    11/20 Cardiac Echo - results pending     ED Treatment:   Medication Administration from 11/19/2018 0949 to 11/19/2018 1330    Date/Time Order Dose Route Action Comments   11/19/2018 1010 sodium chloride 0 9 % bolus 1,000 mL 1,000 mL Intravenous New Bag    11/19/2018 1152 heparin (ACS LOW)   Intravenous Not Given Pharmacy profiled order   11/19/2018 1141 heparin (porcine) injection 3,300 Units 3,300 Units Intravenous Given    11/19/2018 1307 heparin (porcine) 25,000 units in 250 mL infusion (premix) 0 Units/kg/hr Intravenous Hold For a PTT "Greater than or equal to 111: HOLD infusion for 1 hour, then DECREASE drip by 3 units/kg/hr" Pt's PTT greater than 210  Infusion put on hold for one hour  11/19/2018 1141 heparin (porcine) 25,000 units in 250 mL infusion (premix) 12 Units/kg/hr Intravenous New Bag    11/19/2018 1143 aspirin chewable tablet 324 mg 324 mg Oral Given    11/19/2018 1135 metoprolol (LOPRESSOR) injection 5 mg 5 mg Intravenous Given         Past Medical/Surgical History: Active Ambulatory Problems     Diagnosis Date Noted    No Active Ambulatory Problems     Resolved Ambulatory Problems     Diagnosis Date Noted    No Resolved Ambulatory Problems     Past Medical History:   Diagnosis Date    Hypertension      Admitting Diagnosis: Hypothyroidism [E03 9]  NSTEMI (non-ST elevated myocardial infarction) ([address] ) [I21 4]  Change in mental status [R41 82]  QUIANA (acute kidney injury) ([address] ) [N17 9]  Minor head injury, initial encounter [S09 90XA]  Injury, unspecified, initial encounter [T14 90XA]  Asymptomatic hypertensive urgency [I16 0]    Age/Sex: 80 y o  female    Assessment/Plan:   * Near syncope with unwitnessed fall   Assessment & Plan     Patient denies any fall, but per EMS, patient was found on the floor in the pool of feces and was conscious  It is unclear if patient had syncopal event or seizure like activity  Near syncopal event vs syncope related to dehydration vs hypertensive encephalopathy vs NSTEMI vs infection (less likely)? CT head shows No acute intracranial abnormality with some microangiopathic changes  CT C-spine shows no acute fracture      PLAN:  · Monitor on telemetry  · IV hydration  · Trend troponin  · Check echocardiogram  · Check EEG      Encephalopathy   Assessment & Plan     Although she is AO x3, she is intermittently confused about her presentation and situation  Possibly related to uncontrolled hypertension versus dehydration  Less likely acute CVA as no focal deficits noted    She does not have any history of seizures, and this event was not witnessed      PLAN:  · Controlled hypertension  · Neuro checks q 4 hours  · Check EEG  · Check UDS   · IV fluids  · Hold off further brain imaging for now       NSTEMI (non-ST elevated myocardial infarction) [facility])   Assessment & Plan     Minimal troponin leak with high CK MB without any symptoms consistent with angina  EKG is not consistent with ischemic changes  Possibly Type 2  Started on intravenous heparin in the ER      PLAN:  ·  Trend troponin  · Check echocardiogram   · Continue intravenous heparin for now  · Cardiology evaluation  · Blood pressure control as below         Hypertensive crisis   Assessment & Plan     Likely related to noncompliance of medications  She is on Quinapril at home  As per her, recently home blood pressure has been running high in 150s at home  Now with QUIANA and encephalopathy  Blood pressure down to 178 in the ER with 5 mg Lopressor  CT head negative for any intracranial bleed      PLAN:  · P r n  Labetalol for systolic blood pressure more than 180  · Goal blood pressure today between 160-180  · Will hold off on quinapril for now given QUIANA  · Start amlodipine p o           Hypercalcemia   Assessment & Plan     Mild hypercalcemia Likely related to dehydration  Give IV fluids and trend BMP       Acute kidney failure ([facility] [address] )   Assessment & Plan     Likely related to prerenal azotemia based on BUN creatinine ratio      PLAN:  · IV hydration  · Trend BMP  · If no improvement or worsening renal function, check a retroperitoneal ultrasound  · Monitor I&Os      Polycythemia   Assessment & Plan     Has history of polycythemia with baseline hemoglobin of 16- 16 5 with low to normal erythropoietin  Not on any treatment for now  Hemoglobin is 18 , likely increased due to dehydration      PLAN:  -IV hydration   -Trend CBC for now       Leukocytosis   Assessment & Plan     Likely reactive related to dehydration  Rule out acute infection      PLAN:  · IV hydration    · Infection workup with UA and blood cultures  Noted negative chest x-ray for pneumonia  · Monitor for fever  · Trend CBC          VTE Prophylaxis: Heparin Drip  / sequential compression device   Code Status: FULL  Anticipated Length of Stay:  Patient will be admitted on an Inpatient basis with an anticipated length of stay of  more than 2 midnights     Justification for Hospital Stay:  Ongoing evaluation    Admission Orders:  Scheduled Meds:   Current Facility-Administered Medications:  acetaminophen 650 mg Oral Q6H PRN    amLODIPine 5 mg Oral Daily    aspirin 81 mg Oral Daily    bimatoprost 1 drop Both Eyes HS    carvedilol 3 125 mg Oral BID With Meals    hydrALAZINE 10 mg Intravenous Q6H PRN    labetalol 5 mg Intravenous Q6H PRN    levothyroxine 25 mcg Oral Early Morning    oxybutynin 5 mg Oral Daily    potassium-sodium phosphateS 1 tablet Oral BID With Meals    sodium chloride 60 mL/hr Intravenous Continuous Last Rate: 60 mL/hr (11/20/18 1035)     Continuous Infusions:     Heparin drip  Titrated  Off 11/19 @ 1630   sodium chloride 100 mL/hr initially and then 60 Last Rate: 60 mL/hr (11/20/18 1035)     PRN Meds:   acetaminophen    hydrALAZINE x1    Labetalol    Med Surg then level 2 stepdown then back to Med Surg 11/20  Tele   SCDs  Skin care   Neuro checks q 4 hr   Act as jalen   Heparin drip ACS protocol   Orthostatics q shift   Diet cardiac   Cons Gerontology   Cons Wound Care   Cons Cardiology   Cardiac echo

## 2018-11-20 NOTE — OCCUPATIONAL THERAPY NOTE
[address] Evaluation     Patient Name: Kapil Hsu  COVBP'N Date: 11/20/2018  Problem List  Patient Active Problem List   Diagnosis    Near syncope with unwitnessed fall    Leukocytosis    Polycythemia    [facility] failure ([facility] )    NSTEMI (non-ST elevated myocardial infarction) ([facility] )    Encephalopathy    Hypertensive crisis    Hypercalcemia    Fall     Past Medical History  Past Medical History:   Diagnosis Date    Hypertension      Past Surgical History  History reviewed  No pertinent surgical history  11/20/18 1710   Note Type   Note type Eval/Treat   Restrictions/Precautions   Weight Bearing Precautions Per Order No   Other Precautions Cognitive; Chair Alarm; Bed Alarm; Fall Risk;Multiple lines;Pain;Telemetry;Hard of hearing   Pain Assessment   Pain Assessment No/denies pain   Pain Score No Pain   Home Living   Type of Home Apartment   Home Layout Able to live on main level with bedroom/bathroom  ((?) [place] )   Bathroom Shower/Tub Tub/shower unit   Bathroom Toilet Standard   Bathroom Equipment Shower chair;Commode   Bathroom Accessibility Accessible   Home Equipment Cane   Additional Comments PT REPORTS "EVERYONE TELLS ME I SHOULD USE A WALKER BUT I LIKE THE CANE BETTER"   Prior Function   Level of [place] Independent with ADLs and functional mobility   Lives With Alone   Receives Help From (LIMITED PER PT )   ADL Assistance Independent   IADLs Independent   Falls in the last 6 months 1 to 4  (AT LEAST 1-REASON FOR ADMISSION )   Vocational Retired   Lifestyle   Autonomy  [facility] ADLS/IADLS/DRIVING HOWEVER PT [address]  PT REPORTS MOSTLY EATING MICROWAVABLE MEALS AND STATES "Floyce Barefoot TELLS ME I DON'T CLEAN, BUT I DO"     Reciprocal Relationships LIMITED AVAILABLE SUPPORT    Service to Others RETIRED; ART THERAPIST    Intrinsic Gratification ENJOYS WATCHING TV    Psychosocial   Psychosocial (WDL) WDL Subjective   Subjective "I DON'T WANT TO WALK FURTHER WITH YOU BECAUSE I DON'T WANT YOU TO BE RESPONSIBLE WHEN I FALL!"   ADL   Eating Assistance 5  Supervision/Setup   Grooming Assistance 4  Minimal Assistance   UB Bathing Assistance 4  Minimal Assistance   LB Bathing Assistance 3  Moderate Assistance   UB Dressing Assistance 4  Minimal Assistance   LB Dressing Assistance 3  Moderate [address]  3  Moderate Assistance   Functional Assistance 3  Moderate Assistance   Bed Mobility   Additional Comments PT [address] ARRIVAL  PT RETURNED WITH CHAIR ALARM ON    Transfers   Sit to Stand 3  Moderate assistance   Additional items Assist x 1; Increased time required;Verbal cues   Stand to Sit 4  Minimal assistance   Additional items Assist x 1; Increased time required;Verbal cues   Functional Mobility   Additional Comments PT REFUSING FURTHER MOBILITY AT THIS TIME STATING "I DON'T WANT TO WALK FURTHER WITH YOU BECAUSE I DON'T WANT YOU TO BE RESPONSIBLE WHEN I FALL!"  PT EDUCATED ON THE IMPORTANCE OF MOBILITY AND PT BEING SAFE WITH THERAPIST HOWEVER CONT TO REFUSE  Balance   Static Sitting Fair +   Static Standing Poor +   Dynamic Standing Poor   Activity Tolerance   Activity Tolerance Patient limited by fatigue   RUE Assessment   RUE Assessment WFL   LUE Assessment   LUE Assessment WFL   Hand Function   Gross Motor Coordination Functional   Fine Motor Coordination Functional   Sensation   Light Touch No apparent deficits   Cognition   Overall Cognitive Status Impaired   Arousal/Participation Alert   Attention Attends with cues to redirect   Orientation Level Oriented to person;Oriented to place;Oriented to situation;Disoriented to time   Memory Decreased recall of recent events;Decreased recall of precautions   Following Commands Follows one step commands without difficulty   Comments PT IS VERY [place], REQUIRING SIMPLE DIRECT CUES  PT WITH LIMITED INSIGHT/JUDGEMENT/SAFETY AWARENESS   EASILY AGITATED AT TIMES + SELF-LIMITING BEHAVIORS  RECOMMEND ALARM ON PT AT ALL TIMES  Assessment   Limitation Decreased ADL status; Decreased Safe judgement during ADL;Decreased cognition;Decreased endurance;Decreased self-care trans;Decreased high-level ADLs   Prognosis Good;Fair   Assessment 89 YO Female SEEN FOR INITIAL OCCUPATIONAL THERAPY EVALUATION FOLLOWING ADMISSION TO [facility] S/P SYNCOPAL EPISODE RESULTING IN FALL AND BEING DOWN FOR ~3-4 DAYS  PROBLEMS LIST INCLUDES ACUTE ENCEPHALOPATHY, [place], INCONTINENCE, AND HTN  PT IS FROM AN APT ALONE WHERE SHE REPORTS BEING INDEPENDENT WITH ADLS/IADLS/DRIVING PTA  WHEN QUESTIONED ABOUT MEDICATION MANAGEMENT, PT REPORTS "I AM FINE WITH MY MEDICATIONS, SOMETIMES I JUST FORGET TO TAKE THEM"  PT PRESENTS WITH POOR HYGIENE/ UNKEMPT APPEARANCE WITH BELIEVED LIMITED COMPLETION OF ADLS AT BASELINE  PT CURRENTLY REQUIRES OVERALL MOD A WITH ADLS, MIN-MOD A FOR TRANSFERS AND FUNCTIONAL MOBILITY WITHOUT USE OF AD, INCLUDING A STEP FORWARD AND BACKWARD 2' PT REFUSING FURTHER MOBILITY AT THIS TIME  PT RETURNED TO BEDSIDE CHAIR+ SOFT CARE CUSHION WITH ALARM ON AT END OF SESSION  PT IS LIMITED 2' PAIN, FATIGUE, IMPAIRED BALANCE, FALL RISK , LIMITED SAFETY AWARENESS/INSIGHT/JUDGEMENT, DISORIENTATION, OVERALL WEAKNESS/DECONDITIONING, POOR HYGIENE, INCONTINENCE, POOR SKIN INTEGRITY, LIMITED FAMILY/FRIEND SUPPORT  and OVERALL LIMITED ACTIVITY TOLERANCE  PT EDUCATED ON CONTINUE PARTICIPATION IN SELF-CARE/MOBILITY WITH STAFF WHILE IN THE HOSPITAL   FROM AN OCCUPATIONAL THERAPY PERSPECTIVE, PT WOULD BENEFIT FROM ADDITIONAL OT SERVICES IN AN INPT REHAB SETTING UPON D/C  PT WOULD BENEFIT FROM FORMAL COGNITIVE ASSESMENT PRIOR TO D/C HOME ALONE  WILL CONT TO FOLLOW TO ADDRESS THE BELOW DESCRIBED GOALS  Goals   Patient Goals TO SIT DOWN    LTG Time Frame 10-14   Long Term Goal #1 SEE BELOW    Plan   Treatment Interventions ADL retraining;Functional transfer training; Endurance training;Cognitive reorientation;Patient/family training;Equipment evaluation/education; Compensatory technique education; Energy conservation; Activityengagement   Goal Expiration Date 12/04/18   OT Frequency 3-5x/wk   Recommendation   Recommendation (PT ALREADY BEING FOLLOWED BY GERIATRICS )   OT Discharge Recommendation Short Term Rehab   OT - OK to Discharge Yes   Barthel Index   Feeding 5   Bathing 0   Grooming Score 0   Dressing Score 5   Bladder Score 0   Bowels Score 5   Toilet Use Score 5   Transfers (Bed/Chair) Score 5   Mobility (Level Surface) Score 0   Stairs Score 0   Barthel Index Score 25   Modified Bennett Scale   Modified [place] Scale 4       OCCUPATIONAL THERAPY GOALS TO BE MET WITHIN 10-14 DAYS:    -Pt will increase bed mobility to MOD I to participate in functional activities with G tolerance and balance  -Pt will improve functional mobility and transfers to MOD I on/off all surfaces w/ G balance/safety including toileting   -Pt will participate in lt grooming task with MOD I after set-up standing at sink ~3-5 minutes with G safety and balance  -Pt will increase independence in all ADLS to MOD I with G balance sitting upright in chair   -Pt will improve activity tolerance to G for 30 min txment sessions w/ G carry over of learned energy conservation techniques   -Pt will improve independence in lt homemaking activities to MOD I without requiring cues for safety   -Pt will demonstrate G carryover of learned safety techniques and proper body mechanics in functional and leisure activities with use of DME   -Pt will complete additional cognitive assessment (ACLS) with 100% attention to task in order to assist with safe d/c plan  -Pt will follow 100% simple 2-step commands and be A&O x4 consistently with environmental cues to increase participation in functional activities         Documentation completed by MORALES Rasheed, OTR/L

## 2018-11-20 NOTE — RESTORATIVE TECHNICIAN NOTE
Restorative Specialist Mobility Note       Activity: Ambulate in mayes, Chair     Assistive Device: Other (Comment) (HHA x 1)

## 2018-11-20 NOTE — DISCHARGE INSTR - OTHER ORDERS
1  Cleanse b/l buttocks and sacrum with soap and water, pat dry, apply calazime cream to mid sacrum and apply hydraguard to b/l buttocks TID and PRN   2  Apply skin nourishing cream the entire skin daily for moisture  3  Turn and reposition patient every  2 hours   4  Elevate heels off of bed with pillows to offload pressure   5  Soft care cushion to chair when OOB, if able  6    Apply hydraguard to b/l heels BID and PRN for prevention and protection

## 2018-11-20 NOTE — MEDICAL STUDENT
MEDICAL STUDENT  Inpatient Progress Note for TRAINING ONLY  Not Part of Legal Medical Record       Progress Note - Bereket Telles 80 y o  female MRN: [id number]    Unit/Bed#: [facility] 405-01 Encounter: [id number]      Assessment and Plan:    1  Acute encephalopathy   - Patient presents to ED via neighbor calling EMS services who found her on the floor    - Patient reports to me that she had not fallen and that her "landlord called the police because they were mad at me"  On further questioning, she states the landlord might have been mad at her as she is kind of messy around the place and is not very neat   - Patient is alert and oriented to self, place, and time on interview; holds an appropriate conversation with me however was noted to be uncooperative and verbally abusive towards nursing staff earlier today   - CT head negative, UDS negative, EEG negative for seizure activity   - Patient did present with a WBC of 16 45 which is trending down without any antibiotics, no fevers while here   - Blood cultures pending, CXR negative for acute process, UA shows large leuks with 4-10 WBCs   - Patient also presented with hypertensive urgency vs emergency and could be encephalopathic secondary to HTN   - TSH elevated but T4 wnl, patient has a history of hypothyroidism on levothyroxine   - Patient is only on 4 medications at home per records (bimatoprost, levothyroxine, quinapril, solifenacin) and polypharmacy is unlikely as etiology for encephalopathy   - Will consider evaluating for B12 deficiency    - will follow up with blood cultures, control BP as below, monitor vitals, and trend infectious labwork for tomorrow    2   Hypertensive emergency   - Patient presented with a BP of 243/111 controlled to 163/81 this morning   - Given altered mental status and troponinemia, will consider hypertensive emergency   - Patient reports to me that she does not check her blood pressure at home and states that she only takes those medications which she considers "important" - she cannot recall Quinapril on my interview but thinks that she does take it   - Will control BP to goal of 741-976 systolic today on Amlodipine 5 mg daily and Hydralazine and Labatelol prn    3  Ambulatory dysfunction    - Patient was found to be on the floor via EMS, likely secondary to an unwitnessed fall   - Patient denies any recent falls or worsening ambulatory dysfunction or imbalance   - Will obtain PT/OT evaluation    4  NSTEMI 2   - Patient with a TROP elevation to 0 28 trending down on repeats   - Total CK and CK-MB elevated as well, CK-MB trending down as well, 21 4 on presentation to 8 5 today   - Likely NSTEMI 2 secondary to hypertension    - Patient denies any chest pain, shortness of breath, or abdominal pain; no numbness/tingling in left arm or jaw   - Will monitor for clinical signs of ACS; does not appear likely at the moment    5  Metabolic abnormalities   - Patient presented hypercalemic and polycythemic among other metabolic abnormalities; likely due to hemoconcentration secondary to being dehydrated   - Hypophosphatemia on bloodwork, will replete; Mg today of 2 2   - Will trend bloodwork tomorrow with BMP, CBC, repeat Phos       Subjective:   Patient seen and evaluated at bedside armchair  Patient states that she feels fine as if she were in her normally healthy state and would like to go home  Patient denies falling at home but does admit to some head trauma a month ago when she was attempting to move a table and instead fell backwards with the contents of the table falling on her  Unable to ascertain if patient history is reliable at this time or not, she is alert and oriented to self, place, and time and is able to hold an appropriate conversation with me    She only admits to medical complaints of urinary incontinence, decreased hearing, and chronic numbness in her right hand and denies any chest pain, headaches, abdominal pain, shortness of breath, dizziness, lightheadedness, fevers/chills, or imbalance with ambulation  Objective:     Vitals: Blood pressure 161/72, pulse 93, temperature 98 6 °F (37 °C), temperature source Oral, resp  rate 20, weight 56 2 kg (124 lb), SpO2 96 %  ,There is no height or weight on file to calculate BMI  Intake/Output Summary (Last 24 hours) at 11/20/18 1242  Last data filed at 11/20/18 1153   Gross per 24 hour   Intake          1316 33 ml   Output              702 ml   Net           614 33 ml       Physical Exam: /72 (BP Location: Left arm) Comment: Map 92  Pulse 93   Temp 98 6 °F (37 °C) (Oral)   Resp 20   Wt 56 2 kg (124 lb)   SpO2 96%   General appearance: alert and oriented, in no acute distress  Head: Normocephalic, without obvious abnormality, atraumatic  Eyes: conjunctiva clear, pupils b/l 2-3mm  Neck: no adenopathy, no JVD and supple, symmetrical, trachea midline  Back: symmetric, no tenderness to palpation  Lungs: clear to auscultation bilaterally  Heart: regular rate and rhythm, S1, S2 normal and no rub, no murmurs appreciated  Abdomen: soft, non-tender; bowel sounds normal; no masses,  no organomegaly  Extremities: warm and well perfused, SCDs in place but no edema to b/l LE above and below SCDs  Pulses: Radial pulses 2+ and symmetric  Skin: brusing noted to forehead, no other rashes or lesions noted to face, upper back, or extremities  Neurologic: Grossly normal, no sensation to fingertips of right hand, good strength with hand  b/l; GCS 15/15    Invasive Devices     Peripheral Intravenous Line            Peripheral IV 11/19/18 Left Wrist 1 day    Peripheral IV 11/19/18 Right Antecubital 1 day                Lab, Imaging and other studies: I have personally reviewed pertinent reports      VTE Pharmacologic Prophylaxis: Sequential compression device (Venodyne)  and Heparin  VTE Mechanical Prophylaxis: sequential compression device

## 2018-11-20 NOTE — PROGRESS NOTES
[facility] Progress Note  Patient: Riky Officer 80 y o  female   MRN: [id number]  PCP: Patrice Gomez MD  Unit/Bed#: [facility] 405-01 Encounter: [id number]  Date Of Visit: 11/20/18    Assessment:    Principal Problem:    Near syncope with unwitnessed fall  Active Problems:    Leukocytosis    Polycythemia    Acute kidney failure ([facility] )    NSTEMI (non-ST elevated myocardial infarction) ([facility] )    Encephalopathy    Hypertensive crisis    Hypercalcemia    Fall      Plan:    1  Ambulatory dysfunction, unwitnessed fall, negative head CT scan, negative EEG for seizure, negative UDS,  PT OT eval  2  Hypercalcemia, QUIANA, elevated CPK, polycythemia and leukocytosis likely secondary to dehydration, improving with IV fluid, DC Lipitor  3  Hypertensive urgency, BP is improving, started on Norvasc and Coreg, ACE-inhibitor on hold, monitor  3  NSTEMI type 2, likely secondary to above, no ACS,    5  Hypothyroidism with elevated TSH and normal free T4, patient may not been taking levothyroxine, continue current dose of levothyroxine recheck TSH in 2 weeks  6  Hypophosphatemia, replace  6  Metabolic encephalopathy likely secondary to above, suspect underlying cognitive decline, consult geriatric for further management        VTE Pharmacologic Prophylaxis:   Pharmacologic: Heparin  Mechanical VTE Prophylaxis in Place: Yes    Patient Centered Rounds: I have performed bedside rounds with nursing staff today  Discussions with Specialists or Other Care Team Provider:     Education and Discussions with Family / Patient:  Patient    Time Spent for Care: 30 minutes  More than 50% of total time spent on counseling and coordination of care as described above      Current Length of Stay: 1 day(s)    Current Patient Status: Inpatient   Certification Statement: The patient will continue to require additional inpatient hospital stay due to Management of dehydration and ambulatory dysfunction    Discharge Plan / Estimated Discharge Date: not ready yet    Code Status: Level 1 - Full Code      Subjective:   Patient seen and examined  Wants to go home  Denied chest pain or shortness of breath    Objective:     Vitals:   Temp (24hrs), Av 1 °F (36 7 °C), Min:97 4 °F (36 3 °C), Max:98 5 °F (36 9 °C)    Temp:  [97 4 °F (36 3 °C)-98 5 °F (36 9 °C)] 98 4 °F (36 9 °C)  HR:  [] 99  Resp:  [17-28] 17  BP: (126-238)/() 163/81  SpO2:  [94 %-97 %] 94 %  There is no height or weight on file to calculate BMI  Input and Output Summary (last 24 hours): Intake/Output Summary (Last 24 hours) at 18 1013  Last data filed at 18 0748   Gross per 24 hour   Intake          2076 33 ml   Output              702 ml   Net          1374 33 ml       Physical Exam:     Physical Exam  Patient is awake alert in no acute distress  Mild disorientation  Lung clear to auscultation bilateral  Heart positive S1-S2 no murmur  Abdomen soft nontender positive bowel sounds  Lower extremities no edema    Additional Data:     Labs:      Results from last 7 days  Lab Units 18  0443   WBC Thousand/uL 12 80*   HEMOGLOBIN g/dL 14 5   HEMATOCRIT % 45 0   PLATELETS Thousands/uL 154   NEUTROS PCT % 66   LYMPHS PCT % 19   MONOS PCT % 11   EOS PCT % 3       Results from last 7 days  Lab Units 18  0443   POTASSIUM mmol/L 3 9   CHLORIDE mmol/L 115*   CO2 mmol/L 23   BUN mg/dL 48*   CREATININE mg/dL 0 69   CALCIUM mg/dL 9 1   ALK PHOS U/L 74   ALT U/L 39   AST U/L 30       Results from last 7 days  Lab Units 18  1139   INR  1 25*       * I Have Reviewed All Lab Data Listed Above  * Additional Pertinent Lab Tests Reviewed:  [facility] Admission Reviewed    Imaging:    Imaging Reports Reviewed Today Include:   Imaging Personally Reviewed by Myself Includes:     Recent Cultures (last 7 days):           Last 24 Hours Medication List:     Current Facility-Administered Medications:  acetaminophen 650 mg Oral Q6H PRN Juan Andino MD amLODIPine 5 mg Oral Daily Yamilka Sites, MD   aspirin 81 mg Oral Daily Yamilka Sites, MD   atorvastatin 40 mg Oral Daily With Roman Herrera MD   bimatoprost 1 drop Both Eyes HS Irvine Darinel, MD   carvedilol 3 125 mg Oral BID With Meals PEDRO Ryan   hydrALAZINE 10 mg Intravenous Q6H PRN PEDRO Ryan   labetalol 5 mg Intravenous Q6H PRN Yamilka Tena, MD   levothyroxine 25 mcg Oral Early Morning Irvine Sites, MD   oxybutynin 5 mg Oral Daily Yamilkairving Tena, MD        Today, Patient Was Seen By: Flora Weldon DO    ** Please Note: This note has been constructed using a voice recognition system   **

## 2018-11-20 NOTE — PLAN OF CARE
Problem: OCCUPATIONAL THERAPY ADULT  Goal: Performs self-care activities at highest level of function for planned discharge setting  See evaluation for individualized goals  Treatment Interventions: ADL retraining, Functional transfer training, Endurance training, Cognitive reorientation, Patient/family training, Equipment evaluation/education, Compensatory technique education, Energy conservation, Activityengagement          See flowsheet documentation for full assessment, interventions and recommendations  Limitation: Decreased ADL status, Decreased Safe judgement during ADL, Decreased cognition, Decreased endurance, Decreased self-care trans, Decreased high-level ADLs  Prognosis: Good, Fair  Assessment: 89 YO Female SEEN FOR INITIAL OCCUPATIONAL THERAPY EVALUATION FOLLOWING ADMISSION TO [facility] S/ SYNCOPAL EPISODE RESULTING IN FALL AND BEING DOWN FOR ~3-4 DAYS  PROBLEMS LIST INCLUDES ACUTE ENCEPHALOPATHY, [place], INCONTINENCE, AND HTN  PT IS FROM AN APT ALONE WHERE SHE REPORTS BEING INDEPENDENT WITH ADLS/IADLS/DRIVING PTA  WHEN QUESTIONED ABOUT MEDICATION MANAGEMENT, PT REPORTS "I AM FINE WITH MY MEDICATIONS, SOMETIMES I JUST FORGET TO TAKE THEM"  PT PRESENTS WITH POOR HYGIENE/ UNKEMPT APPEARANCE WITH BELIEVED LIMITED COMPLETION OF ADLS AT BASELINE  PT CURRENTLY REQUIRES OVERALL MOD A WITH ADLS, MIN-MOD A FOR TRANSFERS AND FUNCTIONAL MOBILITY WITHOUT USE OF AD, INCLUDING A STEP FORWARD AND BACKWARD 2' PT REFUSING FURTHER MOBILITY AT THIS TIME  PT RETURNED TO BEDSIDE CHAIR+ SOFT CARE CUSHION WITH ALARM ON AT END OF SESSION  PT IS LIMITED 2' PAIN, FATIGUE, IMPAIRED BALANCE, FALL RISK , LIMITED SAFETY AWARENESS/INSIGHT/JUDGEMENT, DISORIENTATION, OVERALL WEAKNESS/DECONDITIONING, POOR HYGIENE, INCONTINENCE, POOR SKIN INTEGRITY, LIMITED FAMILY/FRIEND SUPPORT  and OVERALL LIMITED ACTIVITY TOLERANCE  PT EDUCATED ON CONTINUE PARTICIPATION IN SELF-CARE/MOBILITY WITH STAFF WHILE IN THE HOSPITAL    FROM AN OCCUPATIONAL THERAPY PERSPECTIVE, PT WOULD BENEFIT FROM ADDITIONAL OT SERVICES IN AN INPT REHAB SETTING UPON D/C  PT WOULD BENEFIT FROM FORMAL COGNITIVE ASSESMENT PRIOR TO D/C HOME ALONE  WILL CONT TO FOLLOW TO ADDRESS THE BELOW DESCRIBED GOALS  Recommendation:  (PT ALREADY BEING FOLLOWED BY GERIATRICS )  OT Discharge Recommendation: Short Term Rehab  OT - OK to Discharge:  Yes

## 2018-11-20 NOTE — PROGRESS NOTES
Pt is saturated in urine and is adamantly refusing to be changed  Pt states she is aware she is wet and does not care  Multiple attempts to convince pt to allow nursing to change her but she screams "get out of my room," and swats at staff  Educated patient that her excoriation and urine burns will not improve if she is left in urine, she states "well it doesn't hurt right now " Made attending aware and will continue to monitor

## 2018-11-20 NOTE — CONSULTS
Progress Note - Wound   Donel Flight 80 y o  female MRN: [id number]  Unit/Bed#: [facility] 405-01 Encounter: [id number]      Assessment:  Wound care consulted for assessment of moisture associated skin damage documented by nursing staff on admission  Patient is agreeable and cooperative with assessment  Patient is incontinent and reports she wears pads/briefs  Assist of 1-2 with standing using rolling walker  Patient sitting on soft care cushion on recliner  B/l heels are intact with no redness or wounds noted  Dry skin noted  Recommend offloading and hydraguard cream     Mid sacrum with scattered linear shaped partial thickness wounds  Areas are noted on the base of the gluteal cleft  Wound beds are all measured together  Wound beds are 100% pink  Edges fragile attached intact  Carley-wound with blanchable pink skin and some maceration  B/l buttocks are otherwise intact with no redness or wounds  Recommend calazime cream to the wounds and hydraguard to the buttocks due to incontinence  Educated patient on the importance of frequent offloading of pressure via turning, repositioning and weight-shifting  Verbalized understanding of plan of care  No induration, fluctuance, redness, or purulence noted to the above noted wounds  Patient tolerated well, denies pain to wounds  Primary nurse aware of plan of care  See flow sheets for more detailed assessment findings  Will follow along  Plan:   1  Cleanse b/l buttocks and sacrum with soap and water, pat dry, apply calazime cream to mid sacrum and apply hydraguard to b/l buttocks TID and PRN   2  Apply skin nourishing cream the entire skin daily for moisture  3  Turn and reposition patient every  2 hours   4  Elevate heels off of bed with pillows to offload pressure   5  Soft care cushion to chair when OOB, if able  6  Apply hydraguard to b/l heels BID and PRN for prevention and protection  7   Wound care will follow along weekly, please call with questions and concerns     Objective:    Vitals: Blood pressure 161/72, pulse 93, temperature 98 6 °F (37 °C), temperature source Oral, resp  rate 20, weight 56 2 kg (124 lb), SpO2 96 %  ,There is no height or weight on file to calculate BMI  Wound 11/19/18 Moisture associated skin damage Sacrum Mid sacral slit  (Active)   Wound Description Pink 11/20/2018  1:00 PM   Carley-wound Assessment Dry; Intact;Fragile 11/20/2018  1:00 PM   Shape linear  11/20/2018  1:00 PM   Wound Length (cm) 6 cm 11/20/2018  1:00 PM   Wound Width (cm) 0 1 cm 11/20/2018  1:00 PM   Wound Depth (cm) 0 1 11/20/2018  1:00 PM   Calculated Wound Volume (cm^3) 0 06 cm^3 11/20/2018  1:00 PM   Tunneling 0 cm 11/20/2018  1:00 PM   Undermining 0 11/20/2018  1:00 PM   Closure None 11/20/2018  1:00 PM   Drainage Amount Scant 11/20/2018  1:00 PM   Drainage Description Serosanguineous 11/20/2018  1:00 PM   Non-staged Wound Description Partial thickness 11/20/2018  1:00 PM   Treatments Cleansed;Elevated;Site care 11/20/2018  1:00 PM   Dressing Protective barrier;Open to air 11/20/2018  1:00 PM   Wound packed? No 11/20/2018  1:00 PM   Packing- # removed 0 11/20/2018  1:00 PM   Packing- # inserted 0 11/20/2018  1:00 PM   Patient Tolerance Tolerated well 11/20/2018  1:00 PM   Dressing Status Open to air 11/20/2018  1:00 PM       Recommendations written as orders  AVS updated    Tanika Barnes RN BSN CWON

## 2018-11-20 NOTE — PROGRESS NOTES
Cardiology Progress Note   Shayan Bustamante 80 y o  female MRN: [id number]  Unit/Bed#: [facility] 405-01 Encounter: [id number]      Assessment:  Principal Problem:    Near syncope with unwitnessed fall  Active Problems:    Leukocytosis    Polycythemia    Acute kidney failure (HCC)    NSTEMI (non-ST elevated myocardial infarction) ([facility] [address] )    Encephalopathy    Hypertensive crisis    Hypercalcemia    Fall      Impression:    NSTEMI type 2 due to hypertensive urgency  Hypertensive urgency-improving  Fall-unclear mechanism, unwitnessed  Acute kidney injury - resolved    Plan:    Continue current antihypertensive regimen  Can restart home quinapril    Subjective:   Patient seen and examined  Issues with refusing medications this morning  However when seen by me, she was pleasant, awake, chatty, no complaints  Specifically denies shortness of breath, chest pain, lightheadedness, palpitations      Blood pressures have improved significantly overnight since medication changes yesterday    Meds/Allergies   No Known Allergies    Current Facility-Administered Medications:     acetaminophen (TYLENOL) tablet 650 mg, 650 mg, Oral, Q6H PRN, Shahida Mcdaniel MD    amLODIPine (NORVASC) tablet 5 mg, 5 mg, Oral, Daily, Shahida Mcdaniel MD, 5 mg at 11/19/18 1422    aspirin (ECOTRIN LOW STRENGTH) EC tablet 81 mg, 81 mg, Oral, Daily, Shahida Mcdaniel MD    atorvastatin (LIPITOR) tablet 40 mg, 40 mg, Oral, Daily With Dinner, Shahida Mcdaniel MD, 40 mg at 11/19/18 1651    bimatoprost (LUMIGAN) 0 01 % ophthalmic solution 1 drop, 1 drop, Both Eyes, HS, Shahida Mcdaniel MD    carvedilol (COREG) tablet 3 125 mg, 3 125 mg, Oral, BID With Meals, PEDRO Arauz, 3 125 mg at 11/20/18 0748    hydrALAZINE (APRESOLINE) injection 10 mg, 10 mg, Intravenous, Q6H PRN, PEDRO Arauz    labetalol (NORMODYNE) injection 5 mg, 5 mg, Intravenous, Q6H PRN, Shahida Mcdaniel MD    levothyroxine tablet 25 mcg, 25 mcg, Oral, Early Morning, Shahida Mcdaniel MD, 25 mcg at 18 0606    oxybutynin (DITROPAN-XL) 24 hr tablet 5 mg, 5 mg, Oral, Daily, Sita Espionza MD, 5 mg at 18 1422    Objective   Vitals: Blood pressure 163/81, pulse 99, temperature 98 4 °F (36 9 °C), temperature source Oral, resp  rate 17, weight 56 2 kg (124 lb), SpO2 94 %  , There is no height or weight on file to calculate BMI , Orthostatic Blood Pressures      Most Recent Value   Blood Pressure  163/81 filed at 2018 0748   Patient Position - Orthostatic VS  Sitting filed at 2018 1089        Systolic (94WKJ), MU , Min:126 , OJU:492     Diastolic (49ECC), SHARLA:26, Min:62, Max:113      Intake/Output Summary (Last 24 hours) at 18 0850  Last data filed at 18 0748   Gross per 24 hour   Intake          1896 33 ml   Output              702 ml   Net          1194 33 ml     Weight (last 2 days)     Date/Time   Weight    18 1006  56 2 (124)              No significant arrhythmias seen on telemetry review       Physical Exam:    GEN: Janeth Sos appears well, alert and oriented x 1  NECK: supple, no carotid bruits, no JVD or HJR  HEART: normal rate, regular rhythm, normal S1 and S2, no murmurs, clicks, gallops or rubs   LUNGS: clear to auscultation bilaterally; no wheezes, rales, or rhonchi   ABDOMEN: normal bowel sounds, soft, no tenderness, no distention  EXTREMITIES: peripheral pulses normal; no clubbing, cyanosis, or edema      Laboratory Results:    Results from last 7 days  Lab Units 18  0443 18  2037 18  1713 18  1313 18  1010   CK TOTAL U/L 147  --   --   --  387*   TROPONIN I ng/mL  --  0 24* 0 27* 0 27* 0 28*   CK MB INDEX % 5 8*  --   --   --  5 5*       CBC with diff:   Results from last 7 days  Lab Units 18  0443 18  1010   WBC Thousand/uL 12 80* 16 45*   HEMOGLOBIN g/dL 14 5 18 8*   HEMATOCRIT % 45 0 57 7*   MCV fL 93 92   PLATELETS Thousands/uL 154 198   MCH pg 30 0 30 0   MCHC g/dL 32 2 32 6   RDW % 13 9 14 3 MPV fL 12 7 12 6   NRBC AUTO /100 WBCs 0 0       CMP:  Results from last 7 days  Lab Units 11/20/18  0443 11/19/18  1010   POTASSIUM mmol/L 3 9 4 4   CHLORIDE mmol/L 115* 109*   CO2 mmol/L 23 24   BUN mg/dL 48* 60*   CREATININE mg/dL 0 69 1 04   CALCIUM mg/dL 9 1 10 8*   AST U/L 30 53*   ALT U/L 39 56   ALK PHOS U/L 74 111   EGFR ml/min/1 73sq m 76 47       BMP:  Results from last 7 days  Lab Units 11/20/18  0443 11/19/18  1010   POTASSIUM mmol/L 3 9 4 4   CHLORIDE mmol/L 115* 109*   CO2 mmol/L 23 24   BUN mg/dL 48* 60*   CREATININE mg/dL 0 69 1 04   CALCIUM mg/dL 9 1 10 8*         Magnesium:   Results from last 7 days  Lab Units 11/20/18  0443   MAGNESIUM mg/dL 2 2       Coags:   Results from last 7 days  Lab Units 11/19/18  1139   PTT seconds >210*   INR  1 25*       TSH:    Results from last 7 days  Lab Units 11/19/18  1010   TSH 3RD GENERATON uIU/mL 11 600*   FREE T4 ng/dL 0 93       Hemoglobin A1C )          No results found for this or any previous visit  No results found for this or any previous visit  No results found for this or any previous visit  No results found for this or any previous visit  No results found for this or any previous visit  No results found for this or any previous visit  Nazia Luque MD    Portions of the record may have been created with voice recognition software   Occasional wrong word or "sound a like" substitutions may have occurred due to the inherent limitations of voice recognition software   Read the chart carefully and recognize, using context, where substitutions have occurred

## 2018-11-20 NOTE — PLAN OF CARE
Problem: PHYSICAL THERAPY ADULT  Goal: Performs mobility at highest level of function for planned discharge setting  See evaluation for individualized goals  Treatment/Interventions: Functional transfer training, LE strengthening/ROM, Therapeutic exercise, Endurance training, Patient/family training, Equipment eval/education, Bed mobility, Gait training, Spoke to nursing, Spoke to case management  Equipment Recommended:  (r/o RW next visit)       See flowsheet documentation for full assessment, interventions and recommendations  Prognosis: Guarded  Problem List: Decreased strength, Decreased endurance, Impaired balance, Decreased mobility, Decreased cognition, Decreased safety awareness, Impaired hearing, Impaired judgement  Assessment: Pt is a 80 y o  female who reported to the ED after a fall at home that she cannot remember  Pt Dx near syncope w/ unwitnessed fall  Comorbidities include: HTN; Personal factors include: living alone, steps inside of building, advanced age, and decreased caregiver support  Pt clinical presentation is noted as unpredictable/unstable as noted by ongoing telemetry monitoring and abnormal labs being monitored  Pt demonstrated generalized overall weakness, decreased functional strength, decreased functional endurance, decrease of ambulatory balance requiring use of hand hold assist at this time (will introduce RW next visit), and an inconsistent gait pattern  Pt was willing to participate in PT mobility assessment but declined the use of an AD  PT assisted pt w/ amb through hand hold assist  Pt was noted to have decreased ambulatory balance prompting the PT to ask pt to hold on to the DASH handle for increased stability and safety  Pt was able to complete amb trial of 60' and was returned to her chair  Will continue to follow pt with PT while in hospital to continue progression towards pre morbid functional ambulation   At this time anticipate that pt will require rehab upon DC to further progression of functional mobility  Barriers to Discharge: Inaccessible home environment, Decreased caregiver support     Recommendation: Post acute IP rehab          See flowsheet documentation for full assessment

## 2018-11-20 NOTE — CONSULTS
Consultation - Geriatrics   Jean-Pierre Reynaga 80 y o  female MRN: [id number]  Unit/Bed#: [facility] 405-01 Encounter: [id number]      Assessment/Plan  This is a 55-year-old female with a history of HTN, HLD, polycythemia and hypothyroidism who was admitted to hospital after an unwitnessed fall with subsequent acute encephalopathic symptoms    Acute encephalopathy  Mechanism likely multifactorial in origin:  Seizure versus a syncopal episode versus dehydration versus hypertensive episode versus NSTEMI  Minicog exam : 2/5  Patient was able to recall one-word and could draw a clock with numbers only  Sodium within normal limits of 142, calcium 10 8 mildly increased  Leukocytosis upon admission with WBC 16 45, down trending  A urinalysis also showing moderate blood with large leukocytes  Follow up urine culture and continue management per primary team  Troponins also downtrending  CTH (11/19/18) showed decreased attenuation in periventricular and subcortical white matter most likely to represent moderate microangiopathic change  Atherosclerotic calcifications of the cavernous   segment of the internal carotid artery are moderate  Reorientation redirection recommended  Patient at risk for delirium given age and co-morbidities, recommend global delirium precautions as follows:   - Establishment of day/night cycle via lights during the day and blinds open   Please limit interruptions at  night as medically appropriate  - Patient should be out of bed during the day as tolerated or medically indicated  - Provide glasses/hearing aids as appropriate      - Minimize deliriogenic meds as able  - Provide reorientation including date on board and visible clock  - Avoid restraints as able, frequent verbal reorientations or patient care sitter as appropriate    - Consider use of melatonin qHS for circadian rhythm maintenance     I would also recommend a comprehensive geriatric assessment once patient is discharged from hospital   Information provided in discharge instruction sheet to follow up with an outpatient at [facility] in [facility]  Patient currently continues to drive, I believe it is imperative she also be sent for a driving evaluation at this follow-up visit  S/p Fall  Mechanism likely multifactorial in origin:  Seizure versus a syncopal episode versus dehydration versus hypertensive episode versus NSTEMI  Continue PT/OT for evaluation and treatment  Falls precautions  Recommend starting vitamin-D 1000IU p o  Daily  Would also recommend scheduled Tylenol 650mg po q8h x 3-5d, given patient's encephalopathic state and inability to communicate effectively she has pain  Continue management of acute and chronic conditions per primary team    Ambulatory dysfunction and history of falls  Falls precautions  Recommend PT/OT evaluation and treatment    Deconditioning and debility  Recommend continued supportive care  Continue nutritional support    Hearing loss  Please have patient reached to 2 bring her hearing aids in from home  When speaking to patient, he speak directly and clearly facing her    If hearing aids unavailable, recommend using an amplifier for better communication    Urinary and stool incontinence  Recommend schedule toileting every 2-3 hours once awake  Patient should follow up with her PCP upon discharge for stool incontinence   I would also suggest patient be weaned off oxybutynin as she explains that this is not been helpful with her urinary incontinence especially considering side effects of dizziness, blurred vision and predisposition to UTIs    History of Present Illness   Physician Requesting Consult: Flora Weldon DO  Reason for Consult / Principal Problem:  Encephalopathy  Hx and PE limited by:  Encephalopathy  HPI: Kapil Hsu is a 80y o  year old female  with a history of HTN, HLD, polycythemia and hypothyroidism who was brought into the hospital by EMS for an unwitnessed fall  Patient is a relatively poor historian as result of which some history was obtained from the H and P  Per notes, neighbors noted that the pt's newspapers were not being collected outside the home which prompted them to call EMS  The patient was found lying on the floor of  an unkempt house  by EMS  Per patient, she does admit to occasional dizziness and explains that she was trying to get out of bed when she felt very weak and fell to the ground  She  explains that she has a history of both urinary and stool incontinence which is why she was found in a "pool of feces"  Patient also states that she lives alone in a 3 story building  She resides on the 2nd floor and is able to perform most ADLs independently  Her daughter lives out of state and typically does not like "to bother her"  She explains that she does not cook routinely as she eats frozen dinners mostly, has decreased hearing bilaterally for which she uses hearing aids and wears glasses routinely  She was very upset when asked about using any devices to assist her in walking and she stated that she was "fine"walking on her own  She also admits to having frequent falls over the year but is unable to specify the number and mechanisms of these falls  She explains that she has not sustained any head injuries or prior fractures and continues to drive routinely  She denies any pain at this time  Inpatient consult to Gerontology     Performed by  Karla Bernal by Tammie Rankin              Review of Systems   Constitutional: Positive for activity change  Negative for appetite change, chills and fever  HENT: Positive for hearing loss (Chronic)  Negative for congestion, ear pain, rhinorrhea, sore throat, trouble swallowing and voice change  Eyes: Negative for discharge  Respiratory: Negative for cough, chest tightness, shortness of breath, wheezing and stridor      Cardiovascular: Negative for chest pain, palpitations and leg swelling  Gastrointestinal: Negative for abdominal pain, blood in stool, constipation, diarrhea, nausea and vomiting  Genitourinary: Positive for dysuria (Patient states she has a history of dysuria)  Musculoskeletal: Positive for gait problem (Unsteady)  Negative for arthralgias and back pain  Skin: Negative for color change, pallor, rash and wound  Neurological: Positive for dizziness and weakness  Negative for speech difficulty  Psychiatric/Behavioral: Positive for confusion and decreased concentration  Negative for agitation, behavioral problems and hallucinations  The patient is not nervous/anxious and is not hyperactive  Historical Information   Past Medical History:   Diagnosis Date    Hypertension      History reviewed  No pertinent surgical history  Social History   History   Alcohol Use    Yes     History   Drug Use No     History   Smoking Status    Former Smoker    Types: Cigarettes   Smokeless Tobacco    Never Used         Family History: History reviewed  No pertinent family history      Meds/Allergies   Current meds:   Current Facility-Administered Medications   Medication Dose Route Frequency    acetaminophen (TYLENOL) tablet 650 mg  650 mg Oral Q6H PRN    amLODIPine (NORVASC) tablet 5 mg  5 mg Oral Daily    aspirin (ECOTRIN LOW STRENGTH) EC tablet 81 mg  81 mg Oral Daily    bimatoprost (LUMIGAN) 0 01 % ophthalmic solution 1 drop  1 drop Both Eyes HS    carvedilol (COREG) tablet 3 125 mg  3 125 mg Oral BID With Meals    hydrALAZINE (APRESOLINE) injection 10 mg  10 mg Intravenous Q6H PRN    labetalol (NORMODYNE) injection 5 mg  5 mg Intravenous Q6H PRN    levothyroxine tablet 25 mcg  25 mcg Oral Early Morning    oxybutynin (DITROPAN-XL) 24 hr tablet 5 mg  5 mg Oral Daily    potassium-sodium phosphateS (K-PHOS,PHOSPHA 250) -250 mg tablet 1 tablet  1 tablet Oral BID With Meals    sodium chloride 0 9 % infusion  60 mL/hr Intravenous Continuous Current PTA meds:  Prescriptions Prior to Admission   Medication    bimatoprost (LUMIGAN) 0 01 % ophthalmic drops    levothyroxine 25 mcg tablet    quinapril (ACCUPRIL) 20 mg tablet    solifenacin (VESICARE) 5 mg tablet        No Known Allergies    Objective   Vitals: Blood pressure 163/81, pulse 99, temperature 98 4 °F (36 9 °C), temperature source Oral, resp  rate 17, weight 56 2 kg (124 lb), SpO2 94 %  ,There is no height or weight on file to calculate BMI  Physical Exam   Constitutional: She is oriented to person, place, and time  She appears well-developed and well-nourished  No distress  HENT:   Head: Normocephalic and atraumatic  Nose: Nose normal    Mouth/Throat: Oropharynx is clear and moist  No oropharyngeal exudate  Decreased hearing bilaterally   Eyes: Conjunctivae are normal  Right eye exhibits no discharge  Left eye exhibits no discharge  No scleral icterus  Neck: Normal range of motion  Neck supple  No JVD present  Cardiovascular: Normal rate, regular rhythm and intact distal pulses  Exam reveals no gallop and no friction rub  Murmur (ESM 2/6) heard  Pulmonary/Chest: Effort normal and breath sounds normal  No stridor  No respiratory distress  She has no wheezes  Abdominal: Soft  Bowel sounds are normal  She exhibits no distension  There is no tenderness  Musculoskeletal: Normal range of motion  She exhibits no edema, tenderness or deformity  Neurological: She is alert and oriented to person, place, and time  No cranial nerve deficit  Skin: Skin is warm  No rash noted  She is not diaphoretic  No erythema  No pallor  Psychiatric: She has a normal mood and affect         Lab Results:   Results from last 7 days  Lab Units 11/20/18  0443   WBC Thousand/uL 12 80*   HEMOGLOBIN g/dL 14 5   HEMATOCRIT % 45 0   PLATELETS Thousands/uL 154        Results from last 7 days  Lab Units 11/20/18  0443   POTASSIUM mmol/L 3 9   CHLORIDE mmol/L 115*   CO2 mmol/L 23   BUN mg/dL 48* CREATININE mg/dL 0 69   CALCIUM mg/dL 9 1   ALK PHOS U/L 74   ALT U/L 39   AST U/L 30       Imaging Studies: I have personally reviewed pertinent reports  EKG, Pathology, and Other Studies: I have personally reviewed pertinent reports      VTE Prophylaxis: Sequential compression device (Venodyne)     Code Status: Level 1 - Full Code

## 2018-11-21 PROBLEM — R32 URINARY INCONTINENCE: Status: ACTIVE | Noted: 2018-11-21

## 2018-11-21 PROBLEM — E86.0 DEHYDRATION: Status: ACTIVE | Noted: 2018-11-21

## 2018-11-21 PROBLEM — D72.829 LEUKOCYTOSIS: Status: RESOLVED | Noted: 2018-11-19 | Resolved: 2018-11-21

## 2018-11-21 PROBLEM — R41.89 COGNITIVE IMPAIRMENT: Status: ACTIVE | Noted: 2018-11-21

## 2018-11-21 PROBLEM — R82.71 ASYMPTOMATIC BACTERIURIA: Status: ACTIVE | Noted: 2018-11-21

## 2018-11-21 PROBLEM — E03.9 HYPOTHYROIDISM (ACQUIRED): Status: ACTIVE | Noted: 2018-11-21

## 2018-11-21 PROBLEM — R26.2 AMBULATORY DYSFUNCTION: Status: ACTIVE | Noted: 2018-11-21

## 2018-11-21 PROBLEM — I16.0 HYPERTENSIVE URGENCY: Status: ACTIVE | Noted: 2018-11-19

## 2018-11-21 PROBLEM — E83.52 HYPERCALCEMIA: Status: RESOLVED | Noted: 2018-11-19 | Resolved: 2018-11-21

## 2018-11-21 PROBLEM — I16.9 HYPERTENSIVE CRISIS: Status: RESOLVED | Noted: 2018-11-19 | Resolved: 2018-11-21

## 2018-11-21 PROBLEM — G93.40 ENCEPHALOPATHY: Status: RESOLVED | Noted: 2018-11-19 | Resolved: 2018-11-21

## 2018-11-21 PROBLEM — D75.1 POLYCYTHEMIA: Status: RESOLVED | Noted: 2018-11-19 | Resolved: 2018-11-21

## 2018-11-21 PROBLEM — N17.9 ACUTE KIDNEY FAILURE (HCC): Status: RESOLVED | Noted: 2018-11-19 | Resolved: 2018-11-21

## 2018-11-21 PROBLEM — G92.8 TOXIC METABOLIC ENCEPHALOPATHY: Status: ACTIVE | Noted: 2018-11-19

## 2018-11-21 LAB
ANION GAP SERPL CALCULATED.3IONS-SCNC: 5 MMOL/L (ref 4–13)
BASOPHILS # BLD AUTO: 0.04 THOUSANDS/ΜL (ref 0–0.1)
BASOPHILS NFR BLD AUTO: 0 % (ref 0–1)
BUN SERPL-MCNC: 35 MG/DL (ref 5–25)
CALCIUM SERPL-MCNC: 8.8 MG/DL (ref 8.3–10.1)
CHLORIDE SERPL-SCNC: 114 MMOL/L (ref 100–108)
CO2 SERPL-SCNC: 23 MMOL/L (ref 21–32)
CREAT SERPL-MCNC: 0.63 MG/DL (ref 0.6–1.3)
EOSINOPHIL # BLD AUTO: 0.36 THOUSAND/ΜL (ref 0–0.61)
EOSINOPHIL NFR BLD AUTO: 4 % (ref 0–6)
ERYTHROCYTE [DISTWIDTH] IN BLOOD BY AUTOMATED COUNT: 13.7 % (ref 11.6–15.1)
GFR SERPL CREATININE-BSD FRML MDRD: 79 ML/MIN/1.73SQ M
GLUCOSE SERPL-MCNC: 101 MG/DL (ref 65–140)
HCT VFR BLD AUTO: 42.4 % (ref 34.8–46.1)
HGB BLD-MCNC: 13.6 G/DL (ref 11.5–15.4)
IMM GRANULOCYTES # BLD AUTO: 0.06 THOUSAND/UL (ref 0–0.2)
IMM GRANULOCYTES NFR BLD AUTO: 1 % (ref 0–2)
LYMPHOCYTES # BLD AUTO: 2.19 THOUSANDS/ΜL (ref 0.6–4.47)
LYMPHOCYTES NFR BLD AUTO: 24 % (ref 14–44)
MCH RBC QN AUTO: 29.8 PG (ref 26.8–34.3)
MCHC RBC AUTO-ENTMCNC: 32.1 G/DL (ref 31.4–37.4)
MCV RBC AUTO: 93 FL (ref 82–98)
MONOCYTES # BLD AUTO: 1.04 THOUSAND/ΜL (ref 0.17–1.22)
MONOCYTES NFR BLD AUTO: 11 % (ref 4–12)
NEUTROPHILS # BLD AUTO: 5.4 THOUSANDS/ΜL (ref 1.85–7.62)
NEUTS SEG NFR BLD AUTO: 60 % (ref 43–75)
NRBC BLD AUTO-RTO: 0 /100 WBCS
PLATELET # BLD AUTO: 137 THOUSANDS/UL (ref 149–390)
PMV BLD AUTO: 12.6 FL (ref 8.9–12.7)
POTASSIUM SERPL-SCNC: 3.7 MMOL/L (ref 3.5–5.3)
RBC # BLD AUTO: 4.56 MILLION/UL (ref 3.81–5.12)
SODIUM SERPL-SCNC: 142 MMOL/L (ref 136–145)
WBC # BLD AUTO: 9.09 THOUSAND/UL (ref 4.31–10.16)

## 2018-11-21 PROCEDURE — 80048 BASIC METABOLIC PNL TOTAL CA: CPT | Performed by: INTERNAL MEDICINE

## 2018-11-21 PROCEDURE — 85025 COMPLETE CBC W/AUTO DIFF WBC: CPT | Performed by: INTERNAL MEDICINE

## 2018-11-21 PROCEDURE — 99232 SBSQ HOSP IP/OBS MODERATE 35: CPT | Performed by: INTERNAL MEDICINE

## 2018-11-21 RX ORDER — AMOXICILLIN 250 MG
1 CAPSULE ORAL
Status: DISCONTINUED | OUTPATIENT
Start: 2018-11-21 | End: 2018-11-24 | Stop reason: HOSPADM

## 2018-11-21 RX ORDER — CARVEDILOL 6.25 MG/1
6.25 TABLET ORAL 2 TIMES DAILY WITH MEALS
Status: DISCONTINUED | OUTPATIENT
Start: 2018-11-21 | End: 2018-11-22

## 2018-11-21 RX ADMIN — LISINOPRIL 20 MG: 20 TABLET ORAL at 08:49

## 2018-11-21 RX ADMIN — ASPIRIN 81 MG: 81 TABLET, COATED ORAL at 08:49

## 2018-11-21 RX ADMIN — DIBASIC SODIUM PHOSPHATE, MONOBASIC POTASSIUM PHOSPHATE AND MONOBASIC SODIUM PHOSPHATE 1 TABLET: 852; 155; 130 TABLET ORAL at 08:27

## 2018-11-21 RX ADMIN — DIBASIC SODIUM PHOSPHATE, MONOBASIC POTASSIUM PHOSPHATE AND MONOBASIC SODIUM PHOSPHATE 1 TABLET: 852; 155; 130 TABLET ORAL at 16:34

## 2018-11-21 RX ADMIN — AMLODIPINE BESYLATE 5 MG: 5 TABLET ORAL at 08:49

## 2018-11-21 RX ADMIN — CARVEDILOL 3.12 MG: 3.12 TABLET, FILM COATED ORAL at 08:49

## 2018-11-21 RX ADMIN — BIMATOPROST 1 DROP: 0.1 SOLUTION/ DROPS OPHTHALMIC at 21:26

## 2018-11-21 RX ADMIN — VITAMIN D, TAB 1000IU (100/BT) 1000 UNITS: 25 TAB at 08:49

## 2018-11-21 RX ADMIN — ACETAMINOPHEN 650 MG: 325 TABLET ORAL at 21:26

## 2018-11-21 RX ADMIN — CARVEDILOL 6.25 MG: 6.25 TABLET, FILM COATED ORAL at 16:34

## 2018-11-21 RX ADMIN — SODIUM CHLORIDE 60 ML/HR: 0.9 INJECTION, SOLUTION INTRAVENOUS at 02:44

## 2018-11-21 RX ADMIN — LEVOTHYROXINE SODIUM 25 MCG: 25 TABLET ORAL at 05:56

## 2018-11-21 RX ADMIN — SENNOSIDES AND DOCUSATE SODIUM 1 TABLET: 8.6; 5 TABLET ORAL at 21:26

## 2018-11-21 NOTE — PROGRESS NOTES
[facility] Progress Note  Patient: Lorena Granado 80 y o  female   MRN: [id number]  PCP: Chucho Ngyuen MD  Unit/Bed#: [facility] 405-01 Encounter: [id number]  Date Of Visit: 11/21/18    Assessment:    Principal Problem:    Near syncope with unwitnessed fall  Active Problems:    Leukocytosis    Polycythemia    Acute kidney failure ([facility] [address] )    NSTEMI (non-ST elevated myocardial infarction) ([facility] [address] )    Encephalopathy    Hypertensive crisis    Hypercalcemia    Fall      Plan:    1  Ambulatory dysfunction, unwitnessed fall, negative head CT scan, negative EEG for seizure, negative UDS, cardiac echo with EF 55%,   PT recommending rehab  2  Hypercalcemia, QUIANA, elevated CPK, polycythemia and leukocytosis likely secondary to dehydration, resolved with IV fluid  3  Hypertensive urgency, BP is improving, continue Norvasc, lisinopril and Coreg, monitor  4  NSTEMI type 2, likely secondary to above, no ACS,    5  Hypothyroidism with elevated TSH and normal free T4, patient may not been taking levothyroxine, continue current dose of levothyroxine recheck TSH in 2 weeks  6  Metabolic encephalopathy/ delirium, likely secondary to above, geriatric input appreciated suspect underlying cognitive impairment, continue with supportive care  7  Urinary incontinence, oxybutynin was discontinued  8  Asymptomatic bacteriuria, no cultures, observe off antibiotic    DC IV fluid  DC tele  Awaiting rehab placement  Unable to reach son by phone       VTE Pharmacologic Prophylaxis:   Pharmacologic: Heparin  Mechanical VTE Prophylaxis in Place: Yes    Patient Centered Rounds: I have performed bedside rounds with nursing staff today  Discussions with Specialists or Other Care Team Provider:     Education and Discussions with Family / Patient:  Patient    Time Spent for Care: 30 minutes  More than 50% of total time spent on counseling and coordination of care as described above      Current Length of Stay: 2 day(s)    Current Patient Status: Inpatient   Certification Statement: The patient will continue to require additional inpatient hospital stay due to Management of dehydration and ambulatory dysfunction    Discharge Plan / Estimated Discharge Date:  Awaiting rehab    Code Status: Level 1 - Full Code      Subjective:   Patient seen and examined  Comfortable sitting in chair  Denied chest pain or shortness of breath  Urinary incontinence    Objective:     Vitals:   Temp (24hrs), Av 3 °F (36 8 °C), Min:98 °F (36 7 °C), Max:98 8 °F (37 1 °C)    Temp:  [98 °F (36 7 °C)-98 8 °F (37 1 °C)] 98 °F (36 7 °C)  HR:  [65-95] 69  Resp:  [18-20] 18  BP: (126-177)/() 165/77  SpO2:  [94 %-96 %] 94 %  Body mass index is 26 83 kg/m²  Input and Output Summary (last 24 hours): Intake/Output Summary (Last 24 hours) at 18 0949  Last data filed at 18 0901   Gross per 24 hour   Intake             2100 ml   Output              442 ml   Net             1658 ml       Physical Exam:     Physical Exam     Patient is awake alert in no acute distress  Wants to go home  Lung clear to auscultation bilateral  Heart positive S1-S2 no murmur  Abdomen soft nontender positive bowel sounds  Lower extremities no edema      Additional Data:     Labs:      Results from last 7 days  Lab Units 18  0446   WBC Thousand/uL 9 09   HEMOGLOBIN g/dL 13 6   HEMATOCRIT % 42 4   PLATELETS Thousands/uL 137*   NEUTROS PCT % 60   LYMPHS PCT % 24   MONOS PCT % 11   EOS PCT % 4       Results from last 7 days  Lab Units 18  0444 18  0443   POTASSIUM mmol/L 3 7 3 9   CHLORIDE mmol/L 114* 115*   CO2 mmol/L 23 23   BUN mg/dL 35* 48*   CREATININE mg/dL 0 63 0 69   CALCIUM mg/dL 8 8 9 1   ALK PHOS U/L  --  74   ALT U/L  --  39   AST U/L  --  30       Results from last 7 days  Lab Units 18  1139   INR  1 25*       * I Have Reviewed All Lab Data Listed Above  * Additional Pertinent Lab Tests Reviewed:  [facility] Admission Reviewed    Imaging:    Imaging Reports Reviewed Today Include:   Imaging Personally Reviewed by Myself Includes:     Recent Cultures (last 7 days):       Results from last 7 days  Lab Units 11/19/18  1713   BLOOD CULTURE  No Growth at 24 hrs  No Growth at 24 hrs  Last 24 Hours Medication List:     Current Facility-Administered Medications:  acetaminophen 650 mg Oral Q8H [address] Destiny Fisher DO    amLODIPine 5 mg Oral Daily Aleksandar Connor MD    aspirin 81 mg Oral Daily Aleksandar Connor MD    bimatoprost 1 drop Both Eyes HS Aleksandar Connor MD    carvedilol 3 125 mg Oral BID With Meals PEDRO Carrasquillo    cholecalciferol 1,000 Units Oral Daily Destiny Fisher DO    hydrALAZINE 10 mg Intravenous Q6H PRN PEDRO Carrasquillo    labetalol 5 mg Intravenous Q6H PRN Aleksandar Connor MD    levothyroxine 25 mcg Oral Early Morning Aleksandar Connor MD    lisinopril 20 mg Oral Daily Jyoti Cason MD    potassium-sodium phosphateS 1 tablet Oral BID With Meals Guy Canseco DO    sodium chloride 60 mL/hr Intravenous Continuous Guy Canseco DO Last Rate: 60 mL/hr (11/21/18 2624)        Today, Patient Was Seen By: Guy Canseco DO    ** Please Note: This note has been constructed using a voice recognition system   **

## 2018-11-21 NOTE — PROGRESS NOTES
Cardiology Progress Note   Yohannes Aviles 80 y o  female MRN: [id number]  Unit/Bed#: [facility] 405-01 Encounter: [id number]      Assessment:  Principal Problem:    Ambulatory dysfunction  Active Problems:    Near syncope with unwitnessed fall    Leukocytosis    Polycythemia    Acute kidney failure (HCC)    NSTEMI (non-ST elevated myocardial infarction) ([facility] [address] )    Toxic metabolic encephalopathy    Hypertensive urgency    Hypercalcemia    Fall    Dehydration      Impression:    NSTEMI type 2 due to hypertensive urgency  Hypertensive urgency-stable but still uncontrolled  Fall-unclear mechanism, unwitnessed  Acute kidney injury - resolved    Plan:    Increase carvedilol 6 25 mg b i d  Continue lisinopril, currently in place of home quinapril due to formulary  Continue amlodipine    Subjective:   Patient seen and examined        No complaints to me, no chest pain, no shortness of breath, blood pressures have been stable but still stage I hypertension    Meds/Allergies   No Known Allergies    Current Facility-Administered Medications:     acetaminophen (TYLENOL) tablet 650 mg, 650 mg, Oral, Q8H [facility], Destiny Fisher DO, 650 mg at 11/20/18 2242    amLODIPine (NORVASC) tablet 5 mg, 5 mg, Oral, Daily, Maricel Kilpatrick MD, 5 mg at 11/21/18 0849    aspirin (ECOTRIN LOW STRENGTH) EC tablet 81 mg, 81 mg, Oral, Daily, Maricel Kilpatrick MD, 81 mg at 11/21/18 0849    bimatoprost (LUMIGAN) 0 01 % ophthalmic solution 1 drop, 1 drop, Both Eyes, HS, Maricel Kilpatrick MD, 1 drop at 11/20/18 2242    carvedilol (COREG) tablet 3 125 mg, 3 125 mg, Oral, BID With Meals, PEDRO Gonzalez, 3 125 mg at 11/21/18 0849    cholecalciferol (VITAMIN D3) tablet 1,000 Units, 1,000 Units, Oral, Daily, Destiny Fisher DO, 1,000 Units at 11/21/18 0849    hydrALAZINE (APRESOLINE) injection 10 mg, 10 mg, Intravenous, Q6H PRN, PEDRO Gonzalez    labetalol (NORMODYNE) injection 5 mg, 5 mg, Intravenous, Q6H PRN, Maricel Kilpatrick MD    levothyroxine tablet 25 mcg, 25 mcg, Oral, Early Morning, Emma Loredo MD, 25 mcg at 11/21/18 0556    lisinopril (ZESTRIL) tablet 20 mg, 20 mg, Oral, Daily, Bisi Van MD, 20 mg at 11/21/18 0849    potassium-sodium phosphateS (K-PHOS,PHOSPHA 250) -250 mg tablet 1 tablet, 1 tablet, Oral, BID With Meals, Percilla Files, DO, 1 tablet at 11/21/18 0827    Objective   Vitals: Blood pressure 165/77, pulse 69, temperature 98 °F (36 7 °C), temperature source Oral, resp  rate 18, height 4' 9" (1 448 m), weight 56 2 kg (124 lb), SpO2 94 %  , Body mass index is 26 83 kg/m² ,   Orthostatic Blood Pressures      Most Recent Value   Blood Pressure  165/77 [Map 111] filed at 11/21/2018 0808   Patient Position - Orthostatic VS  Sitting filed at 11/21/2018 1896        Systolic (64PMT), HSM:772 , Min:126 , ABU:859     Diastolic (73DGW), MYP:49, Min:58, Max:102      Intake/Output Summary (Last 24 hours) at 11/21/18 1023  Last data filed at 11/21/18 1000   Gross per 24 hour   Intake             2355 ml   Output              442 ml   Net             1913 ml     Weight (last 2 days)     Date/Time   Weight    11/19/18 1006  56 2 (124)              No significant arrhythmias seen on telemetry review       Physical Exam:    GEN: Татьяна Jones appears awake, alert, oriented x1  NECK:  No JVD  HEART:  RRR, no murmur  LUNGS:  Clear bilaterally, no rales  ABDOMEN:  Soft, nontender  EXTREMITIES:  Pulses normal, no edema      Laboratory Results:    Results from last 7 days  Lab Units 11/20/18  0443 11/19/18  2037 11/19/18  1713 11/19/18  1313 11/19/18  1010   CK TOTAL U/L 147  --   --   --  387*   TROPONIN I ng/mL  --  0 24* 0 27* 0 27* 0 28*   CK MB INDEX % 5 8*  --   --   --  5 5*       CBC with diff:     Results from last 7 days  Lab Units 11/21/18  0446 11/20/18  0443 11/19/18  1010   WBC Thousand/uL 9 09 12 80* 16 45*   HEMOGLOBIN g/dL 13 6 14 5 18 8*   HEMATOCRIT % 42 4 45 0 57 7*   MCV fL 93 93 92   PLATELETS Thousands/uL 137* 154 198   MCH pg 29 8 30 0 30 0   MCHC g/dL 32 1 32 2 32 6   RDW % 13 7 13 9 14 3   MPV fL 12 6 12 7 12 6   NRBC AUTO /100 WBCs 0 0 0       CMP:    Results from last 7 days  Lab Units 11/21/18  0444 11/20/18  0443 11/19/18  1010   POTASSIUM mmol/L 3 7 3 9 4 4   CHLORIDE mmol/L 114* 115* 109*   CO2 mmol/L 23 23 24   BUN mg/dL 35* 48* 60*   CREATININE mg/dL 0 63 0 69 1 04   CALCIUM mg/dL 8 8 9 1 10 8*   AST U/L  --  30 53*   ALT U/L  --  39 56   ALK PHOS U/L  --  74 111   EGFR ml/min/1 73sq m 79 76 47       BMP:    Results from last 7 days  Lab Units 11/21/18  0444 11/20/18  0443 11/19/18  1010   POTASSIUM mmol/L 3 7 3 9 4 4   CHLORIDE mmol/L 114* 115* 109*   CO2 mmol/L 23 23 24   BUN mg/dL 35* 48* 60*   CREATININE mg/dL 0 63 0 69 1 04   CALCIUM mg/dL 8 8 9 1 10 8*         Magnesium:     Results from last 7 days  Lab Units 11/20/18  0443   MAGNESIUM mg/dL 2 2       Coags:     Results from last 7 days  Lab Units 11/19/18  1139   PTT seconds >210*   INR  1 25*           Teodora Dubin, MD    Portions of the record may have been created with voice recognition software   Occasional wrong word or "sound a like" substitutions may have occurred due to the inherent limitations of voice recognition software   Read the chart carefully and recognize, using context, where substitutions have occurred

## 2018-11-21 NOTE — RESTORATIVE TECHNICIAN NOTE
Restorative Specialist Mobility Note       Activity: Ambulate in mayes, [address] privileges, Chair     Assistive Device: Other (Comment) (A x 2)

## 2018-11-21 NOTE — PLAN OF CARE
Present in room with Dr Daina Trujillo to discuss plan of care for discharge to rehabilitation when placement available, discontinue NSS infusion, and discontinue telemetry monitoring  Questions and concerns addressed from patient at this time

## 2018-11-21 NOTE — MEDICAL STUDENT
MEDICAL STUDENT  Inpatient Progress Note for TRAINING ONLY  Not Part of Legal Medical Record       Progress Note - Riky Officer 80 y o  female MRN: [id number]    Unit/Bed#: [facility] 405-01 Encounter: [id number]      1  Acute encephalopathy              - Patient presents to ED via neighbor calling EMS services who found her on the floor               - Patient reports to me that she had not fallen and that her "landlord called the police because they were mad at me"   On further questioning, she states the landlord might have been mad at her as she is kind of messy around the place and is not very neat              - CT head negative, UDS negative, EEG negative for seizure activity              - Patient did present with a WBC of 16 45 which is trending down without any antibiotics, no fevers while here              - Blood cultures negative for growth at 24 hrs, CXR negative for acute process, UA showed large leuks with 4-10 WBCs              - Patient also presented with hypertensive urgency vs emergency and could be encephalopathic secondary to HTN              - TSH elevated but T4 wnl, patient has a history of hypothyroidism on levothyroxine 25 mcg daily, would recheck TSH outpatient               - Patient is only on 4 medications at home per records (bimatoprost, levothyroxine, quinapril, solifenacin) and polypharmacy is unlikely as etiology for encephalopathy              - Evaluate for B12/folate deficiency outpatient               - Patient with appropriate and improved mentation today on interview; recalls having seen me yesterday and recalls bits of our conversation yesterday    Discharge planning:   - Regarding discharge, patient reports that she has been to [facility] in the past and liked it there; is agreeable to rehabilitation there or at a similar facility prior to returning home   - She lives independently at home and prepares her own meals and still drives for groceries and other errands; she admits that she is worried about how she will handle meals if she is no longer able to drive and states that while she has not had any issues with driving, she is concerned about her ability to keep driving safely   - She refuses to use a walker or other assistive device based on our discussion and continues to deny any falls prior to admission here     2  Hypertensive emergency              - Patient presented with a BP of 243/111 controlled 160s yesterday              - Given altered mental status and troponinemia, will consider to be hypertensive emergency              - Patient's medications discussed again today; she admits to noncompliance but when consequences of hypertensive episodes were discussed, she expresses regret and states that she would be more careful in the future   - BP range over past 24 hours of: 126/58 to 177/70              - Will control BP to goal of <619 systolic today but would want to avoid decreasing it beyond systolic <775; on amlodipine 5 mg, coreg 3 125 mg bid, and lisinopril 20 mg with hydralazine and labetalol prn      3  Ambulatory dysfunction               - Patient was found to be on the floor via EMS, likely secondary to an unwitnessed fall              - Patient denies any recent falls or worsening ambulatory dysfunction or imbalance              - Pt evaluated by OT, recommended for short term rehab     4  Urinary incontinence   - Patient with several incontinence episodes both here and at home   - Evaluated by geriatrics, recommended to d/c oxybutynin as this is not helping her and the anticholinergic side effects may contribute to delirium/cognitive decline    5   NSTEMI 2              - Patient with a TROP elevation to 0 28 trending down on repeats              - Total CK and CK-MB elevated as well, CK-MB trending down as well, 21 4 on presentation to 8 5 yesterday              - Likely NSTEMI 2 secondary to hypertension               - Patient denies any chest pain, shortness of breath, or abdominal pain; no numbness/tingling in left arm or jaw              - Will continue to monitor for clinical s/sx of ACS    6  Metabolic abnormalities              - Patient presented hypercalemic and polycythemic among other metabolic abnormalities; likely due to hemoconcentration secondary to being dehydrated   - Electrolytes were repleted, will reevaluate as needed      Subjective:   Patient seen and evaluated at bedside armchair  She continues to be alert and oriented to self, place, and time and has appropriate conversation with me recalling and incorporating bits of our conversation yesterday as well  She denies any medical complaints today and states she would like to go home as she has things she would like to do there and has PT appointments with a therapist she likes working with  On further questioning, she admits to lightheadedness with ambulation and difficulty with ambulation but states that she would refuse to use a walker or other assistive device  She denies any chest pain, fevers/chills, SOB, abdominal pain, nausea, difficulty with bowel movements, or any headaches or lightheadedness at rest     Objective:     Vitals: Blood pressure 165/77, pulse 69, temperature 98 °F (36 7 °C), temperature source Oral, resp  rate 18, height 4' 9" (1 448 m), weight 56 2 kg (124 lb), SpO2 94 %  ,Body mass index is 26 83 kg/m²        Intake/Output Summary (Last 24 hours) at 11/21/18 0841  Last data filed at 11/21/18 0608   Gross per 24 hour   Intake             2100 ml   Output              247 ml   Net             1853 ml       Physical Exam: /77 (BP Location: Right arm) Comment: Map 111  Pulse 69   Temp 98 °F (36 7 °C) (Oral)   Resp 18   Ht 4' 9" (1 448 m) Comment: per encounter review  Wt 56 2 kg (124 lb)   SpO2 94%   BMI 26 83 kg/m²   General appearance: alert and oriented, in no acute distress  Head: Normocephalic, without obvious abnormality, atraumatic  Back: symmetric, no curvature  Lungs: clear to auscultation bilaterally  Heart: regular rate and rhythm; 3/6 systolic ejection murmur appreciated at RUSB  Abdomen: soft, non-tender; bowel sounds normal; no masses,  no organomegaly  Extremities: extremities normal, warm and well-perfused; no cyanosis or edema  Pulses: 2+ and symmetric  Skin: Skin color, texture, turgor normal  No rashes or lesions  Neurologic: Grossly normal     Invasive Devices     Peripheral Intravenous Line            Peripheral IV 11/21/18 Right Forearm less than 1 day                Lab, Imaging and other studies: I have personally reviewed pertinent reports      VTE Pharmacologic Prophylaxis: Sequential compression device (Venodyne)   VTE Mechanical Prophylaxis: sequential compression device

## 2018-11-21 NOTE — PROGRESS NOTES
Progress Note - Janeth Jauregui 80 y o  female MRN: [id number]    Unit/Bed#: [facility] 405-01 Encounter: [id number]      Assessment/Plan  1  Toxic metabolic encephalopathy/delirium  Multifactorial  Continue treating other reversible conditions such as dehydration  Continue with Tylenol 650 mg Q 8  Will add Senokot S daily for bowel regimen  Patient is alert and oriented to person, place, month, does not know year, suspect this may be baseline  Patient is pleasant today, was refusing care yesterday  Redirect unwanted patient behaviors as first line tx  Reorient patient frequently  Avoid deliriogenic meds including tramadol, benzodiazepines, benadryl  Good sleep hygiene important, limit night time interruptions  Encourage patient to stay awake during the day  Ensure adequate hydration/nutrition  Mobilize often     2  Cognitive impairment  Scores low on mini cog yesterday, repeated today, patient remembers 0 of 3 words  Patient not appropriate to live at home by herself  Recommend OT ACLS assessment  Patient will need long-term care after rehab  She is currently agreeable to [facility] after discharge   No signs of macrocytic anemia, will defer B12, folate checks  Continue supportive care  Patient may benefit from formal cognitive assessment as outpatient, this be done at [facility] upon discharge    3  Status post fall  History of multiple falls  Continue with vitamin D3 1000 international units daily patient's medication regimen  PT, OT recommending rehab, agree  Patient will need plan for long-term placement following rehab, as unsafe to live at home alone    4  Deconditioning  Continue supportive care  Mobilize frequently  Continue with Ensure    5   Urinary incontinence  Oxybutynin discontinued  Patient is not refusing to be washed up today, patient reports she is incontinent, nursing notified  Recommend frequent toileting every 2-3 hours while patient awake to avoid incontinent episodes    6  Hard of hearing  Patient incredibly hard of hearing  Recommend using amplification device while in hospital    7  Ambulatory dysfunction  See 3 , 4  Rehab recommended    8  Visual impairment  Patient wears glasses, ensure she is wearing them at all appropriate times  Keep room well lit 1 appropriate    9  Dehydration  Slim following and managing           Subjective:   Patient alert, pleasant, oriented to place, month  Does not know year  Has cognitive impairment at baseline  Was previously delirious, this has since improved  Patient reports she has been incontinent would like to be washed up  Pertinent review of systems cannot be gathered secondary to cognitive impairment at baseline, but patient denies any pain or problems other than the incontinence  Objective:     Vitals: Blood pressure 165/77, pulse 69, temperature 98 °F (36 7 °C), temperature source Oral, resp  rate 18, height 4' 9" (1 448 m), weight 56 2 kg (124 lb), SpO2 94 %  ,Body mass index is 26 83 kg/m²  Intake/Output Summary (Last 24 hours) at 11/21/18 1035  Last data filed at 11/21/18 1000   Gross per 24 hour   Intake             2355 ml   Output              442 ml   Net             1913 ml       Physical Exam: General : WD in NAD, very [place]  HEENT : MMM no erythema or exudates  EOMI, sclera anicteric  Heart : Normal rate  Lungs : CTA  Abdomen : Soft, NT/ND, BS auscultated in all 4 quads  No organomegaly  Ext :  Pulses +2/4 B/L  Neg edema B/L  Neg calf swelling B/L  Skin : Pink, warm, dry, age appropriate turgor and mobility  Neuro : Nonfocal  Psych : A * O x 3, delirium improved, +cognitive impairment, 0/3 delayed recall       Invasive Devices     Peripheral Intravenous Line            Peripheral IV 11/21/18 Right Forearm less than 1 day                Lab, Imaging and other studies: I have personally reviewed pertinent reports      VTE Pharmacologic Prophylaxis: Sequential compression device (Venodyne)   VTE Mechanical Prophylaxis: sequential compression device

## 2018-11-22 PROCEDURE — 99232 SBSQ HOSP IP/OBS MODERATE 35: CPT | Performed by: INTERNAL MEDICINE

## 2018-11-22 RX ORDER — CARVEDILOL 12.5 MG/1
12.5 TABLET ORAL 2 TIMES DAILY WITH MEALS
Status: DISCONTINUED | OUTPATIENT
Start: 2018-11-22 | End: 2018-11-24 | Stop reason: HOSPADM

## 2018-11-22 RX ORDER — AMLODIPINE BESYLATE 10 MG/1
10 TABLET ORAL DAILY
Status: DISCONTINUED | OUTPATIENT
Start: 2018-11-23 | End: 2018-11-24 | Stop reason: HOSPADM

## 2018-11-22 RX ADMIN — DIBASIC SODIUM PHOSPHATE, MONOBASIC POTASSIUM PHOSPHATE AND MONOBASIC SODIUM PHOSPHATE 1 TABLET: 852; 155; 130 TABLET ORAL at 08:39

## 2018-11-22 RX ADMIN — SENNOSIDES AND DOCUSATE SODIUM 1 TABLET: 8.6; 5 TABLET ORAL at 21:19

## 2018-11-22 RX ADMIN — AMLODIPINE BESYLATE 5 MG: 5 TABLET ORAL at 08:38

## 2018-11-22 RX ADMIN — VITAMIN D, TAB 1000IU (100/BT) 1000 UNITS: 25 TAB at 08:39

## 2018-11-22 RX ADMIN — ACETAMINOPHEN 650 MG: 325 TABLET ORAL at 21:20

## 2018-11-22 RX ADMIN — LISINOPRIL 20 MG: 20 TABLET ORAL at 08:38

## 2018-11-22 RX ADMIN — LEVOTHYROXINE SODIUM 25 MCG: 25 TABLET ORAL at 05:10

## 2018-11-22 RX ADMIN — CARVEDILOL 6.25 MG: 6.25 TABLET, FILM COATED ORAL at 08:39

## 2018-11-22 RX ADMIN — BIMATOPROST 1 DROP: 0.1 SOLUTION/ DROPS OPHTHALMIC at 21:20

## 2018-11-22 RX ADMIN — ASPIRIN 81 MG: 81 TABLET, COATED ORAL at 08:38

## 2018-11-22 RX ADMIN — CARVEDILOL 12.5 MG: 12.5 TABLET, FILM COATED ORAL at 16:40

## 2018-11-22 NOTE — PROGRESS NOTES
[facility] Internal Medicine Progress Note  Patient: Toña Albert 80 y o  female   MRN: [id number]  PCP: Antwan Horn MD  Unit/Bed#: [facility] 405-01 Encounter: [id number]  Date Of Visit: 11/22/18    Assessment:    Principal Problem:    Ambulatory dysfunction  Active Problems:    Near syncope with unwitnessed fall    NSTEMI (non-ST elevated myocardial infarction) ([facility] [address] )    Toxic metabolic encephalopathy    Hypertensive urgency    Fall    Dehydration    Cognitive impairment    Urinary incontinence    Asymptomatic bacteriuria    Hypothyroidism (acquired)      Plan:    1  Ambulatory dysfunction, unwitnessed fall, likely secondary to dehydration,  negative head CT scan, negative EEG for seizure, negative UDS, cardiac echo with EF 55%,  PT recommending rehab  2  Hypercalcemia, QUIANA, elevated CPK, polycythemia and leukocytosis likely secondary to dehydration, resolved with IV fluid  3  Hypertensive urgency, BP is improving, continue Norvasc, lisinopril and Coreg, monitor  4  NSTEMI type 2, likely secondary to above, no ACS,    5  Hypothyroidism with elevated TSH and normal free T4, patient may not been taking levothyroxine, continue current dose of levothyroxine recheck TSH in 2 weeks  6  Metabolic encephalopathy/ delirium, likely secondary to above, geriatric input appreciated suspect underlying cognitive impairment, continue with supportive care  7  Urinary incontinence, oxybutynin was discontinued  8  Asymptomatic bacteriuria, no cultures, observe off antibiotic      Awaiting rehab placement         VTE Pharmacologic Prophylaxis:   Pharmacologic: Heparin  Mechanical VTE Prophylaxis in Place: Yes    Patient Centered Rounds: I have performed bedside rounds with nursing staff today  Discussions with Specialists or Other Care Team Provider:     Education and Discussions with Family / Patient:  Patient, unable to reach son by phone    Time Spent for Care: 30 minutes    More than 50% of total time spent on counseling and coordination of care as described above  Current Length of Stay: 3 day(s)    Current Patient Status: Inpatient   Certification Statement: The patient will continue to require additional inpatient hospital stay due to Management of dehydration and ambulatory dysfunction    Discharge Plan / Estimated Discharge Date:  Awaiting rehab    Code Status: Level 1 - Full Code      Subjective:   Patient seen and examined  Comfortable sitting in chair  No event overnight  No complaints  Asking for book to read    Objective:     Vitals:   Temp (24hrs), Av 3 °F (36 8 °C), Min:97 9 °F (36 6 °C), Max:99 °F (37 2 °C)    Temp:  [97 9 °F (36 6 °C)-99 °F (37 2 °C)] 98 °F (36 7 °C)  HR:  [72-86] 72  Resp:  [18] 18  BP: (158-183)/(70-92) 183/92  SpO2:  [93 %-95 %] 95 %  Body mass index is 26 83 kg/m²  Input and Output Summary (last 24 hours): Intake/Output Summary (Last 24 hours) at 18 1028  Last data filed at 18 0947   Gross per 24 hour   Intake              520 ml   Output              625 ml   Net             -105 ml       Physical Exam:     Physical Exam     Patient is awake alert in no acute distress  Lung clear to auscultation bilateral  Heart positive S1-S2 no murmur  Abdomen soft nontender positive bowel sounds  Lower extremities no edema      Additional Data:     Labs:      Results from last 7 days  Lab Units 18  0446   WBC Thousand/uL 9 09   HEMOGLOBIN g/dL 13 6   HEMATOCRIT % 42 4   PLATELETS Thousands/uL 137*   NEUTROS PCT % 60   LYMPHS PCT % 24   MONOS PCT % 11   EOS PCT % 4       Results from last 7 days  Lab Units 18  0444 18  0443   POTASSIUM mmol/L 3 7 3 9   CHLORIDE mmol/L 114* 115*   CO2 mmol/L 23 23   BUN mg/dL 35* 48*   CREATININE mg/dL 0 63 0 69   CALCIUM mg/dL 8 8 9 1   ALK PHOS U/L  --  74   ALT U/L  --  39   AST U/L  --  30       Results from last 7 days  Lab Units 18  1139   INR  1 25*       * I Have Reviewed All Lab Data Listed Above    * Additional Pertinent Lab Tests Reviewed: [address] Admission Reviewed    Imaging:    Imaging Reports Reviewed Today Include:   Imaging Personally Reviewed by Myself Includes:     Recent Cultures (last 7 days):       Results from last 7 days  Lab Units 11/19/18  1713   BLOOD CULTURE  No Growth at 48 hrs  No Growth at 48 hrs  Last 24 Hours Medication List:     Current Facility-Administered Medications:  acetaminophen 650 mg Oral Q8H [address] Destiny Fisher DO   [START ON 11/23/2018] amLODIPine 10 mg Oral Daily Deniz Bar MD   aspirin 81 mg Oral Daily Chris Hamilton MD   bimatoprost 1 drop Both Eyes HS Chris Hamilton MD   carvedilol 12 5 mg Oral BID With Meals Deniz Bar MD   cholecalciferol 1,000 Units Oral Daily Destiny Fisher DO   hydrALAZINE 10 mg Intravenous Q6H PRN Skip Geovanna CRJUWAN   labetalol 5 mg Intravenous Q6H PRN Chris Hamilton MD   levothyroxine 25 mcg Oral Early Morning Chris Hamilton MD   lisinopril 20 mg Oral Daily Deniz Bar MD   senna-docusate sodium 1 tablet Oral HS Kris Diaz PA-C        Today, Patient Was Seen By: Paul Watson DO    ** Please Note: This note has been constructed using a voice recognition system   **

## 2018-11-22 NOTE — PROGRESS NOTES
Cardiology Progress Note   Irineo Montana 80 y o  female MRN: [id number]  Unit/Bed#: [facility] 405-01 Encounter: [id number]      Assessment:  Principal Problem:    Ambulatory dysfunction  Active Problems:    Near syncope with unwitnessed fall    NSTEMI (non-ST elevated myocardial infarction) ([facility] [address] )    Toxic metabolic encephalopathy    Hypertensive urgency    Fall    Dehydration    Cognitive impairment    Urinary incontinence    Asymptomatic bacteriuria    Hypothyroidism (acquired)      Impression:    NSTEMI type 2 due to hypertensive urgency  Hypertensive urgency-uncontrolled  Fall-unclear mechanism, unwitnessed  Acute kidney injury - resolved    Plan:    Increase carvedilol to 12 5 mg b i d , amlodipine to 10 mg b i d , and continue lisinopril 20 mg daily    Subjective:   Patient seen and examined  She offers no complaints to me  She denies chest pain, lightheadedness, palpitations, dizziness, shortness of breath      Hypertension had improved, blood pressure is now rising    Meds/Allergies   No Known Allergies    Current Facility-Administered Medications:     acetaminophen (TYLENOL) tablet 650 mg, 650 mg, Oral, Q8H [address], Destiny Fisher, DO, 650 mg at 11/21/18 2126    [START ON 11/23/2018] amLODIPine (NORVASC) tablet 10 mg, 10 mg, Oral, Daily, Luan Davenport MD    aspirin (ECOTRIN LOW STRENGTH) EC tablet 81 mg, 81 mg, Oral, Daily, Camille Brewster MD, 81 mg at 11/22/18 0838    bimatoprost (LUMIGAN) 0 01 % ophthalmic solution 1 drop, 1 drop, Both Eyes, HS, Camille Brewster MD, 1 drop at 11/21/18 2126    carvedilol (COREG) tablet 12 5 mg, 12 5 mg, Oral, BID With Meals, Luan Davenport MD    cholecalciferol (VITAMIN D3) tablet 1,000 Units, 1,000 Units, Oral, Daily, Destiny Fisher DO, 1,000 Units at 11/22/18 0839    hydrALAZINE (APRESOLINE) injection 10 mg, 10 mg, Intravenous, Q6H PRN, Abe Pellet, CRNP    labetalol (NORMODYNE) injection 5 mg, 5 mg, Intravenous, Q6H PRN, Camille Brewster MD    levothyroxine tablet 25 mcg, 25 mcg, Oral, Early Morning, Aleksandar Connor MD, 25 mcg at 11/22/18 0510    lisinopril (ZESTRIL) tablet 20 mg, 20 mg, Oral, Daily, Jyoti Cason MD, 20 mg at 11/22/18 0838    senna-docusate sodium (SENOKOT S) 8 6-50 mg per tablet 1 tablet, 1 tablet, Oral, HS, Rui Shankar PA-C, 1 tablet at 11/21/18 2126    Objective   Vitals: Blood pressure (!) 183/92, pulse 72, temperature 98 °F (36 7 °C), temperature source Oral, resp  rate 18, height 4' 9" (1 448 m), weight 56 2 kg (124 lb), SpO2 95 %  , Body mass index is 26 83 kg/m² ,   Orthostatic Blood Pressures      Most Recent Value   Blood Pressure   183/92 [Map 115] filed at 11/22/2018 0718   Patient Position - Orthostatic VS  Sitting filed at 11/22/2018 8651        Systolic (80UUR), ZCE:101 , Min:158 , YOR:572     Diastolic (39FZR), KND:72, Min:70, Max:92      Intake/Output Summary (Last 24 hours) at 11/22/18 0911  Last data filed at 11/22/18 0703   Gross per 24 hour   Intake              775 ml   Output              425 ml   Net              350 ml     Weight (last 2 days)     None            Physical Exam:    GEN: Liz Escudero appears awake, alert, oriented x2  NECK:  No evidence of JVD  HEART:  Regular rate and rhythm, no murmurs  LUNGS:  The bilaterally, no rales  ABDOMEN:  Soft, nontender  EXTREMITIES:  Pulses normal, no edema      Laboratory Results:    Results from last 7 days  Lab Units 11/20/18  0443 11/19/18  2037 11/19/18  1713 11/19/18  1313 11/19/18  1010   CK TOTAL U/L 147  --   --   --  387*   TROPONIN I ng/mL  --  0 24* 0 27* 0 27* 0 28*   CK MB INDEX % 5 8*  --   --   --  5 5*       CBC with diff:     Results from last 7 days  Lab Units 11/21/18  0446 11/20/18  0443 11/19/18  1010   WBC Thousand/uL 9 09 12 80* 16 45*   HEMOGLOBIN g/dL 13 6 14 5 18 8*   HEMATOCRIT % 42 4 45 0 57 7*   MCV fL 93 93 92   PLATELETS Thousands/uL 137* 154 198   MCH pg 29 8 30 0 30 0   MCHC g/dL 32 1 32 2 32 6   RDW % 13 7 13 9 14 3   MPV fL 12 6 12 7 12 6 NRBC AUTO /100 WBCs 0 0 0       CMP:    Results from last 7 days  Lab Units 11/21/18  0444 11/20/18  0443 11/19/18  1010   POTASSIUM mmol/L 3 7 3 9 4 4   CHLORIDE mmol/L 114* 115* 109*   CO2 mmol/L 23 23 24   BUN mg/dL 35* 48* 60*   CREATININE mg/dL 0 63 0 69 1 04   CALCIUM mg/dL 8 8 9 1 10 8*   AST U/L  --  30 53*   ALT U/L  --  39 56   ALK PHOS U/L  --  74 111   EGFR ml/min/1 73sq m 79 76 47       BMP:    Results from last 7 days  Lab Units 11/21/18  0444 11/20/18  0443 11/19/18  1010   POTASSIUM mmol/L 3 7 3 9 4 4   CHLORIDE mmol/L 114* 115* 109*   CO2 mmol/L 23 23 24   BUN mg/dL 35* 48* 60*   CREATININE mg/dL 0 63 0 69 1 04   CALCIUM mg/dL 8 8 9 1 10 8*         Magnesium:     Results from last 7 days  Lab Units 11/20/18  0443   MAGNESIUM mg/dL 2 2       Coags:     Results from last 7 days  Lab Units 11/19/18  1139   PTT seconds >210*   INR  1 25*           Leandro Cain MD    Portions of the record may have been created with voice recognition software   Occasional wrong word or "sound a like" substitutions may have occurred due to the inherent limitations of voice recognition software   Read the chart carefully and recognize, using context, where substitutions have occurred

## 2018-11-23 LAB
ANION GAP SERPL CALCULATED.3IONS-SCNC: 3 MMOL/L (ref 4–13)
BUN SERPL-MCNC: 18 MG/DL (ref 5–25)
CALCIUM SERPL-MCNC: 8.6 MG/DL (ref 8.3–10.1)
CHLORIDE SERPL-SCNC: 109 MMOL/L (ref 100–108)
CO2 SERPL-SCNC: 27 MMOL/L (ref 21–32)
CREAT SERPL-MCNC: 0.63 MG/DL (ref 0.6–1.3)
GFR SERPL CREATININE-BSD FRML MDRD: 79 ML/MIN/1.73SQ M
GLUCOSE SERPL-MCNC: 89 MG/DL (ref 65–140)
MAGNESIUM SERPL-MCNC: 2 MG/DL (ref 1.6–2.6)
PHOSPHATE SERPL-MCNC: 2.8 MG/DL (ref 2.3–4.1)
POTASSIUM SERPL-SCNC: 3.8 MMOL/L (ref 3.5–5.3)
SODIUM SERPL-SCNC: 139 MMOL/L (ref 136–145)

## 2018-11-23 PROCEDURE — 97530 THERAPEUTIC ACTIVITIES: CPT

## 2018-11-23 PROCEDURE — 99232 SBSQ HOSP IP/OBS MODERATE 35: CPT | Performed by: INTERNAL MEDICINE

## 2018-11-23 PROCEDURE — 80048 BASIC METABOLIC PNL TOTAL CA: CPT | Performed by: INTERNAL MEDICINE

## 2018-11-23 PROCEDURE — 83735 ASSAY OF MAGNESIUM: CPT | Performed by: INTERNAL MEDICINE

## 2018-11-23 PROCEDURE — 99231 SBSQ HOSP IP/OBS SF/LOW 25: CPT | Performed by: INTERNAL MEDICINE

## 2018-11-23 PROCEDURE — 97116 GAIT TRAINING THERAPY: CPT

## 2018-11-23 PROCEDURE — 97535 SELF CARE MNGMENT TRAINING: CPT | Performed by: STUDENT IN AN ORGANIZED HEALTH CARE EDUCATION/TRAINING PROGRAM

## 2018-11-23 PROCEDURE — 84100 ASSAY OF PHOSPHORUS: CPT | Performed by: INTERNAL MEDICINE

## 2018-11-23 PROCEDURE — G0515 COGNITIVE SKILLS DEVELOPMENT: HCPCS | Performed by: STUDENT IN AN ORGANIZED HEALTH CARE EDUCATION/TRAINING PROGRAM

## 2018-11-23 RX ADMIN — CARVEDILOL 12.5 MG: 12.5 TABLET, FILM COATED ORAL at 08:10

## 2018-11-23 RX ADMIN — ENOXAPARIN SODIUM 40 MG: 40 INJECTION SUBCUTANEOUS at 15:06

## 2018-11-23 RX ADMIN — CARVEDILOL 12.5 MG: 12.5 TABLET, FILM COATED ORAL at 15:34

## 2018-11-23 RX ADMIN — ASPIRIN 81 MG: 81 TABLET, COATED ORAL at 08:10

## 2018-11-23 RX ADMIN — HYDRALAZINE HYDROCHLORIDE 10 MG: 20 INJECTION INTRAMUSCULAR; INTRAVENOUS at 23:27

## 2018-11-23 RX ADMIN — AMLODIPINE BESYLATE 10 MG: 10 TABLET ORAL at 08:10

## 2018-11-23 RX ADMIN — LEVOTHYROXINE SODIUM 25 MCG: 25 TABLET ORAL at 06:24

## 2018-11-23 RX ADMIN — ACETAMINOPHEN 650 MG: 325 TABLET ORAL at 21:12

## 2018-11-23 RX ADMIN — ACETAMINOPHEN 650 MG: 325 TABLET ORAL at 06:24

## 2018-11-23 RX ADMIN — LISINOPRIL 20 MG: 20 TABLET ORAL at 08:10

## 2018-11-23 RX ADMIN — BIMATOPROST 1 DROP: 0.1 SOLUTION/ DROPS OPHTHALMIC at 21:12

## 2018-11-23 RX ADMIN — VITAMIN D, TAB 1000IU (100/BT) 1000 UNITS: 25 TAB at 08:10

## 2018-11-23 RX ADMIN — ACETAMINOPHEN 650 MG: 325 TABLET ORAL at 15:03

## 2018-11-23 RX ADMIN — SENNOSIDES AND DOCUSATE SODIUM 1 TABLET: 8.6; 5 TABLET ORAL at 21:12

## 2018-11-23 NOTE — UTILIZATION REVIEW
Continued Stay Review    Date: 11/23/2018  Age/Sex: 80 y o  female     Assessment/Plan: 1208  1  Ambulatory dysfunction, unwitnessed fall, likely secondary to dehydration,  negative head CT scan, negative EEG for seizure, negative UDS, cardiac echo with EF 55%,  PT recommending rehab  2  Hypercalcemia, QUIANA, elevated CPK, polycythemia and leukocytosis likely secondary to dehydration, resolved with IV fluid  3  Hypertensive urgency, BP is improving, continue Norvasc, lisinopril and Coreg, cardiology is following,  monitor  4  NSTEMI type 2, likely secondary to above, no ACS,    5  Hypothyroidism with elevated TSH and normal free T4, patient may not been taking levothyroxine, continue current dose of levothyroxine recheck TSH in 2 weeks  6  Metabolic encephalopathy/ delirium, likely secondary to above, improving,  per geriatric suspect underlying cognitive impairment, continue with supportive care  7  Urinary incontinence, oxybutynin was discontinued  8  Asymptomatic bacteriuria, no cultures, observe off antibiotic  VTE Pharmacologic Prophylaxis:   Pharmacologic:  Lovenox  Mechanical VTE Prophylaxis in Place:  Yes   will continue to require additional inpatient hospital stay due to Management of dehydration and ambulatory dysfunction   Discharge Plan: TBD    Vital Signs: BP (!) 178/79 (BP Location: Right arm) Comment: Map 114  Pulse 65   Temp (!) 97 4 °F (36 3 °C) (Oral)   Resp 18   Ht 4' 9" (1 448 m) Comment: per encounter review  Wt 56 2 kg (124 lb)   SpO2 95%   BMI 26 83 kg/m²     Medications:   Scheduled Meds:   Current Facility-Administered Medications:  acetaminophen 650 mg Oral Q8H [facility] Destiny Fisher DO   amLODIPine 10 mg Oral Daily Cony Danielle MD   aspirin 81 mg Oral Daily Versa Crigler, MD   bimatoprost 1 drop Both Eyes HS Versa Crigler, MD   carvedilol 12 5 mg Oral BID With Meals Cony Danielle MD   cholecalciferol 1,000 Units Oral Daily Destiny Fisher DO   enoxaparin 40 mg Subcutaneous Q24H [address] Guy DO Ajith   hydrALAZINE 10 mg Intravenous Q6H PRN PEDRO Carrasquillo   labetalol 5 mg Intravenous Q6H PRN Aleksandar Connor MD   levothyroxine 25 mcg Oral Early Morning Aleksandar Connor MD   lisinopril 20 mg Oral Daily Jyoti Cason MD   senna-docusate sodium 1 tablet Oral HS Rui Shankar PA-C     Abnormal Labs/Diagnostic Results:

## 2018-11-23 NOTE — OCCUPATIONAL THERAPY NOTE
11/23/18 1427   Restrictions/Precautions   Weight Bearing Precautions Per Order No   Other Precautions Cognitive; Chair Alarm;Telemetry;Multiple lines;Hard of hearing; Fall Risk   Pain Assessment   Pain Assessment No/denies pain   Pain Score No Pain   ADL   Where Assessed Chair   LB Dressing Assistance 3  Moderate Assistance   LB Dressing Deficit Setup;Don/doff R sock; Don/doff L sock   LB Dressing Comments don/doff socks   Cognition   Overall Cognitive Status Impaired   Arousal/Participation Responsive   Attention Attends with cues to redirect   Orientation Level Oriented to person;Oriented to situation   Memory Decreased short term memory;Decreased recall of recent events   Following Commands Follows one step commands inconsistently   Cognition Assessment Tools ACLS   Score 3 4   Activity Tolerance   Activity Tolerance Patient tolerated treatment well   Assessment   Assessment Pt participates in OT session with focus on LB dressing and cognitive reorientation to increase I for d/c  Pt scored 3 4/6 0 on ACLS  Please see below for details  Pt mod A LB dressing to don/doff socks while seated in bedside chair  Pt able to doff socks on both feet, but needs A to don 2* decreased ROM  Chair alarm turned on post session  Pt will continue to benefit from activity tolerance, adls, and transfers  Plan   Treatment Interventions ADL retraining;Cognitive reorientation; Activityengagement   Goal Expiration Date 12/04/18   Treatment Day 1   OT Frequency 3-5x/wk   Recommendation   OT Discharge Recommendation Short Term Rehab     3 4    Administered Adri Burgos Cognitive Level Screen (ACLS)  Pt scored 3 4/6 0 indicating 24 hour care is recommended to sequence through routine steps of toileting, bathing, grooming and dressing  Behavior:  Speaks without considering comprehension of listener  May be distractible  Grooming:  Spontaneously sustains actions of combing, brushing, shaving with electric razor or applying makeup  Uses too much or too little toothpaste/makeup  Looks at objects but fails to note effects  Restrict access to harmful objects  Dressing:  Selects items laid out and begins to don garments  May pick several items and be unable to decide  May quit before finished or don several layers  May not pay attention to condition of garments (cleanliness or need for repair)  Bathing:  Picks up washcloth, soap, towel and wipes easy to reach body parts  May wash in one spot, forget to use soap, may not remove all dirt or quit before task is complete  Patient should not be left alone during bathing tasks  Walking and exercising:  Ambulates within 2 or 3 familiar rooms to access desirable activities  Can alter ambulation pace but is easily distracted  May be impulsive when changing positions  Has difficulty walking and talking at the same time  Is at risk for falls  Eating:  May anticipate meal times based on familiar signs (activity in kitchen)  Uses all utensils except knife  Rate may be rushed and may eat strongly preferred items only  Failure to observe manners may alienate others  Check food and beverage temperature  Cannot follow dietary restrictions  Toileting:  Recognizes need to void and goes to familiar bathrooms  May not close door while in bathroom  Dons and doffs clothing slowly, may need help with unusual fasteners  Wipes but does not check results or wipes repeatedly using excess toilet paper  May forget to wash hands  May leave zipper open  May need reminders to toilet in order to avoid accidents  Medications: May recognize medications by color or shape when it is given daily but does not note amounts or time of day  Does not understand purpose of medications or side effects, may mistake for candy  Prescription bottles need to be child proof  Store medications in secure location away from patient  Pre-measured medications should be handed to the patient  Use of adaptive devices:  May be able to propel a wheelchair but cannot get around furniture and may get lost if allowed outside  May not be able to utilize adaptive devices in safe manner  May not be able to learn use of new adaptive device  Housekeeping:  No awareness of need for housekeeping  May  cloth and begin action of cleaning  Does not note results and may quit when distracted  Meal Preparation:  Does not plan for food  May eat from refrigerator  May be able to replicate repetitive actions for simple meal prep with supervision  May be impulsive and require frequent redirection  Restrict access to sharp tools and hot objects/surfaces  Spending money:  May recognize local currency  May hand money to another person in a familiar exchange situation; however, may not attend to amount given/received  May not understand money is owed for services  May loose money  Should have assistance for all finances  Shopping: Follows a guide to shopping areas  Looks in windows or at displays with no intent to purchase  May take items without paying  May fail to notice price or if they have enough money to pay  Do not leave alone in shopping areas  Laundry:  May not recognize clothing is dirty  Has no awareness of methods of doing laundry  May do repetitive actions but may not be able to sequence through steps of task  Traveling: May be able to sit for 30 minutes in a car  May not be aware of destination  May recognize features on a familiar route  May attempt to enter or leave car before it is fully stopped  Use child safety locks  Telephone:  Able to  phone when it rings and say hello  May be able to call for another person or hang up when phone is not for them  Not able to take messages  May dial 1 or 2 known numbers, but may call for no reason  May forget to hang up     Driving:  Should not operate a motor vehicle

## 2018-11-23 NOTE — PHYSICAL THERAPY NOTE
Physical Therapy Progress Note     11/23/18 2375   Pain Assessment   Pain Assessment No/denies pain   Restrictions/Precautions   Other Precautions Cognitive; Chair Alarm;Telemetry; Fall Risk;Hard of hearing   Subjective   Subjective Pt encountered with seated in chair with PCA present requiring boost as she had slumped  Pt agreeable to treatment & was eager to walk  No complaints of dizziness or syncope  Required frequent cues & instructions for mobility tasks & repeat instructions due to being [facility]  Transfers   Sit to Stand 4  Minimal assistance   Additional items Assist x 1; Increased time required;Verbal cues   Stand to Sit 4  Minimal assistance   Additional items Assist x 1; Increased time required;Armrests; Verbal cues   Toilet transfer 3  Moderate assistance   Additional items Assist x 1; Increased time required;Standard toilet;Armrests   Ambulation/Elevation   Gait pattern Poor UE support;Narrow SHAWN; Decreased foot clearance;Shuffling; Short stride; Step to;Excessively slow   Gait Assistance 4  Minimal assist   Additional items Assist x 1  (+ chair follow)   Assistive Device Rolling walker   Distance 120' x 3   Balance   Static Sitting Fair +   Static Standing Fair -   Ambulatory Poor +   Endurance Deficit   Endurance Deficit Yes   Endurance Deficit Description complained of wobbly legs & slight breathlessness   Activity Tolerance   Activity Tolerance Patient tolerated treatment well;Patient limited by fatigue   Nurse Made Aware yes   Assessment   Prognosis Guarded   Problem List Decreased strength;Decreased endurance; Impaired balance;Decreased mobility; Decreased cognition;Decreased safety awareness; Impaired hearing; Impaired judgement   Assessment Pt encountered as noted above with PCA  Pt required assist to boost to correct seated posture, but then able to perform all other transfers with less assistance  Pt required multiple cues and instructions throughout session due to cognitive deficits & [place]    Pt performed toilet transfer before ambulation, but required assist due to low seat  Pt ambulated safely in room and hallway with min A & chair follow to ensure safety  Pt took 2 seated rested rests, and complained of slight SOB  No LOB observed during session  Pt returned to room, and performed final sit to stand with min A & able to scoot back into chair without assist   Chair alarm activiated & all needs in reach  Pt will continue to benfit from therapy services to improve activity tolerance, LE strength, and promote safe functional mobility  Barriers to Discharge Decreased caregiver support; Inaccessible home environment   Goals   Patient Goals to walk with a cane again   STG Expiration Date 11/30/18   Treatment Day 1   Plan   Treatment/Interventions Functional transfer training;LE strengthening/ROM; Therapeutic exercise; Endurance training;Patient/family training;Equipment eval/education; Bed mobility;Gait training   Progress Progressing toward goals   PT Frequency (3-5x/week)   Recommendation   Recommendation Post acute IP rehab   Equipment Recommended Candy Cintron, MICHAEL

## 2018-11-23 NOTE — PLAN OF CARE
Problem: PHYSICAL THERAPY ADULT  Goal: Performs mobility at highest level of function for planned discharge setting  See evaluation for individualized goals  Treatment/Interventions: Functional transfer training, LE strengthening/ROM, Therapeutic exercise, Endurance training, Patient/family training, Equipment eval/education, Bed mobility, Gait training, Spoke to nursing, Spoke to case management  Equipment Recommended:  (r/o RW next visit)       See flowsheet documentation for full assessment, interventions and recommendations  Outcome: Progressing  Prognosis: Guarded  Problem List: Decreased strength, Decreased endurance, Impaired balance, Decreased mobility, Decreased cognition, Decreased safety awareness, Impaired hearing, Impaired judgement  Assessment: Pt encountered as noted above with PCA  Pt required assist to boost to correct seated posture, but then able to perform all other transfers with less assistance  Pt required multiple cues and instructions throughout session due to cognitive deficits & [place]  Pt performed toilet transfer before ambulation, but required assist due to low seat  Pt ambulated safely in room and hallway with min A & chair follow to ensure safety  Pt took 2 seated rested rests, and complained of slight SOB  No LOB observed during session  Pt returned to room, and performed final sit to stand with min A & able to scoot back into chair without assist   Chair alarm activiated & all needs in reach  Pt will continue to benfit from therapy services to improve activity tolerance, LE strength, and promote safe functional mobility  Barriers to Discharge: Decreased caregiver support, Inaccessible home environment     Recommendation: Post acute IP rehab          See flowsheet documentation for full assessment

## 2018-11-23 NOTE — PROGRESS NOTES
[facility] Internal [facility] Progress Note  Patient: Татьяна Jones 80 y o  female   MRN: [id number]  PCP: Dali Carmen MD  Unit/Bed#: [facility] 405-01 Encounter: [id number]  Date Of Visit: 11/23/18    Assessment:    Principal Problem:    Ambulatory dysfunction  Active Problems:    Near syncope with unwitnessed fall    NSTEMI (non-ST elevated myocardial infarction) ([facility] [address] )    Toxic metabolic encephalopathy    Hypertensive urgency    Fall    Dehydration    Cognitive impairment    Urinary incontinence    Asymptomatic bacteriuria    Hypothyroidism (acquired)      Plan:    1  Ambulatory dysfunction, unwitnessed fall, likely secondary to dehydration,  negative head CT scan, negative EEG for seizure, negative UDS, cardiac echo with EF 55%,  PT recommending rehab  2  Hypercalcemia, QUIANA, elevated CPK, polycythemia and leukocytosis likely secondary to dehydration, resolved with IV fluid  3  Hypertensive urgency, BP is improving, continue Norvasc, lisinopril and Coreg, cardiology is following,  monitor  4  NSTEMI type 2, likely secondary to above, no ACS,    5  Hypothyroidism with elevated TSH and normal free T4, patient may not been taking levothyroxine, continue current dose of levothyroxine recheck TSH in 2 weeks  6  Metabolic encephalopathy/ delirium, likely secondary to above, improving,  per geriatric suspect underlying cognitive impairment, continue with supportive care  7  Urinary incontinence, oxybutynin was discontinued  8  Asymptomatic bacteriuria, no cultures, observe off antibiotic      Awaiting rehab placement  Discussed with patient's son Tamar Montes De Oca      VTE Pharmacologic Prophylaxis:   Pharmacologic:  Lovenox  Mechanical VTE Prophylaxis in Place: Yes    Patient Centered Rounds: I have performed bedside rounds with nursing staff today  Discussions with Specialists or Other Care Team Provider:     Education and Discussions with Family / Patient:  Patient    Time Spent for Care: 30 minutes    More than 50% of total time spent on counseling and coordination of care as described above  Current Length of Stay: 4 day(s)    Current Patient Status: Inpatient   Certification Statement: The patient will continue to require additional inpatient hospital stay due to Management of dehydration and ambulatory dysfunction    Discharge Plan / Estimated Discharge Date:  Awaiting rehab placement    Code Status: Level 1 - Full Code      Subjective:   Patient seen and examined  Comfortable sitting in chair  No event overnight    Objective:     Vitals:   Temp (24hrs), Av 7 °F (36 5 °C), Min:97 4 °F (36 3 °C), Max:98 1 °F (36 7 °C)    Temp:  [97 4 °F (36 3 °C)-98 1 °F (36 7 °C)] 97 4 °F (36 3 °C)  HR:  [65-76] 65  Resp:  [18] 18  BP: (142-178)/(61-79) 178/79  SpO2:  [93 %-95 %] 95 %  Body mass index is 26 83 kg/m²  Input and Output Summary (last 24 hours): Intake/Output Summary (Last 24 hours) at 18 1042  Last data filed at 18 0536   Gross per 24 hour   Intake              920 ml   Output              332 ml   Net              588 ml       Physical Exam:     Physical Exam     Patient is awake alert in no acute distress  Lung clear to auscultation bilateral  Heart positive S1-S2 no murmur  Abdomen soft nontender positive bowel sounds  Lower extremities no edema      Additional Data:     Labs:      Results from last 7 days  Lab Units 18  0446   WBC Thousand/uL 9 09   HEMOGLOBIN g/dL 13 6   HEMATOCRIT % 42 4   PLATELETS Thousands/uL 137*   NEUTROS PCT % 60   LYMPHS PCT % 24   MONOS PCT % 11   EOS PCT % 4       Results from last 7 days  Lab Units 18  0458  18  0443   POTASSIUM mmol/L 3 8  < > 3 9   CHLORIDE mmol/L 109*  < > 115*   CO2 mmol/L 27  < > 23   BUN mg/dL 18  < > 48*   CREATININE mg/dL 0 63  < > 0 69   CALCIUM mg/dL 8 6  < > 9 1   ALK PHOS U/L  --   --  74   ALT U/L  --   --  39   AST U/L  --   --  30   < > = values in this interval not displayed      Results from last 7 days  Lab Units 11/19/18  1139   INR  1 25*       * I Have Reviewed All Lab Data Listed Above  * Additional Pertinent Lab Tests Reviewed: [facility] Admission Reviewed    Imaging:    Imaging Reports Reviewed Today Include:   Imaging Personally Reviewed by Myself Includes:     Recent Cultures (last 7 days):       Results from last 7 days  Lab Units 11/19/18  1713   BLOOD CULTURE  No Growth at 72 hrs  No Growth at 72 hrs  Last 24 Hours Medication List:     Current Facility-Administered Medications:  acetaminophen 650 mg Oral Q8H [facility] Destiny Fisher DO   amLODIPine 10 mg Oral Daily Sonia Victor MD   aspirin 81 mg Oral Daily Doc Grullon MD   bimatoprost 1 drop Both Eyes HS Doc Grullon MD   carvedilol 12 5 mg Oral BID With Meals Sonia Victor MD   cholecalciferol 1,000 Units Oral Daily Destiny Fisher DO   hydrALAZINE 10 mg Intravenous Q6H PRN PEDRO Villagran   labetalol 5 mg Intravenous Q6H PRN Doc Grullon MD   levothyroxine 25 mcg Oral Early Morning Doc Grullon MD   lisinopril 20 mg Oral Daily Sonia Victor MD   senna-docusate sodium 1 tablet Oral HS Ronen Hutton PA-C        Today, Patient Was Seen By: Mary Hood DO    ** Please Note: This note has been constructed using a voice recognition system   **

## 2018-11-23 NOTE — PROGRESS NOTES
Cardiology Progress Note   Crystalon Course 80 y o  female MRN: [id number]  Unit/Bed#: [facility] 405-01 Encounter: [id number]      Assessment:  Principal Problem:    Ambulatory dysfunction  Active Problems:    Near syncope with unwitnessed fall    NSTEMI (non-ST elevated myocardial infarction) ([facility] [address] )    Toxic metabolic encephalopathy    Hypertensive urgency    Fall    Dehydration    Cognitive impairment    Urinary incontinence    Asymptomatic bacteriuria    Hypothyroidism (acquired)      Impression:    NSTEMI type 2 due to hypertensive urgency  Hypertensive urgency-still intermittently uncontrolled  Fall-unclear mechanism, unwitnessed  Acute kidney injury - resolved    Plan: Today will be the 1st dose of increased amlodipine 10 mg  Lisinopril being substituted for home quinapril currently  Carvedilol was added and up titrated to 12 5 mg b i d  Continue to observe blood pressures, no changes for now    Subjective:   Patient seen and examined  She denies chest pain, shortness of breath, lightheadedness, palpitations  Blood pressure still intermittently uncontrolled      Meds/Allergies   No Known Allergies    Current Facility-Administered Medications:     acetaminophen (TYLENOL) tablet 650 mg, 650 mg, Oral, Q8H [address], Destiny Fisher DO, 650 mg at 11/23/18 0624    amLODIPine (NORVASC) tablet 10 mg, 10 mg, Oral, Daily, Pedro Maynard MD, 10 mg at 11/23/18 0810    aspirin (ECOTRIN LOW STRENGTH) EC tablet 81 mg, 81 mg, Oral, Daily, Virginia He MD, 81 mg at 11/23/18 0810    bimatoprost (LUMIGAN) 0 01 % ophthalmic solution 1 drop, 1 drop, Both Eyes, HS, Virginia He MD, 1 drop at 11/22/18 2120    carvedilol (COREG) tablet 12 5 mg, 12 5 mg, Oral, BID With Meals, Pedro Maynard MD, 12 5 mg at 11/23/18 0810    cholecalciferol (VITAMIN D3) tablet 1,000 Units, 1,000 Units, Oral, Daily, Destiny Fisher DO, 1,000 Units at 11/23/18 0810    enoxaparin (LOVENOX) subcutaneous injection 40 mg, 40 mg, Subcutaneous, Q24H [address], Mickeal Rey, DO    hydrALAZINE (APRESOLINE) injection 10 mg, 10 mg, Intravenous, Q6H PRN, PEDRO Medina    labetalol (NORMODYNE) injection 5 mg, 5 mg, Intravenous, Q6H PRN, Marquita Carter MD    levothyroxine tablet 25 mcg, 25 mcg, Oral, Early Morning, Marquita Carter MD, 25 mcg at 11/23/18 5449    lisinopril (ZESTRIL) tablet 20 mg, 20 mg, Oral, Daily, Rosario Keita MD, 20 mg at 11/23/18 0810    senna-docusate sodium (SENOKOT S) 8 6-50 mg per tablet 1 tablet, 1 tablet, Oral, HS, SHAD Tran-C, 1 tablet at 11/22/18 2119    Objective   Vitals: Blood pressure (!) 178/79, pulse 65, temperature (!) 97 4 °F (36 3 °C), temperature source Oral, resp  rate 18, height 4' 9" (1 448 m), weight 56 2 kg (124 lb), SpO2 95 %  , Body mass index is 26 83 kg/m² ,   Orthostatic Blood Pressures      Most Recent Value   Blood Pressure   178/79 [Map 947] filed at 11/23/2018 0713   Patient Position - Orthostatic VS  Lying filed at 11/23/2018 2099        Systolic (48POV), HFL:220 , Min:158 , IUF:260     Diastolic (02CSC), PSC:45, Min:69, Max:79      Intake/Output Summary (Last 24 hours) at 11/23/18 1258  Last data filed at 11/23/18 1136   Gross per 24 hour   Intake              780 ml   Output              212 ml   Net              568 ml     Weight (last 2 days)     None            Physical Exam:    GEN: Brianne Burrows appears awake, alert, oriented to self, smells of urine  NECK:  No evidence of jugular venous distension  HEART:  RRR, no murmurs  LUNGS:  Clear bilaterally, no wheezing  ABDOMEN:  Soft, nontender  EXTREMITIES:  Warm and well perfused, no edema      Laboratory Results:    Results from last 7 days  Lab Units 11/20/18  0443 11/19/18  2037 11/19/18  1713 11/19/18  1313 11/19/18  1010   CK TOTAL U/L 147  --   --   --  387*   TROPONIN I ng/mL  --  0 24* 0 27* 0 27* 0 28*   CK MB INDEX % 5 8*  --   --   --  5 5*       CBC with diff:     Results from last 7 days  Lab Units 11/21/18  0446 11/20/18  0443 11/19/18  1010   WBC Thousand/uL 9 09 12 80* 16 45*   HEMOGLOBIN g/dL 13 6 14 5 18 8*   HEMATOCRIT % 42 4 45 0 57 7*   MCV fL 93 93 92   PLATELETS Thousands/uL 137* 154 198   MCH pg 29 8 30 0 30 0   MCHC g/dL 32 1 32 2 32 6   RDW % 13 7 13 9 14 3   MPV fL 12 6 12 7 12 6   NRBC AUTO /100 WBCs 0 0 0       CMP:    Results from last 7 days  Lab Units 11/23/18 0458 11/21/18 0444 11/20/18  0443 11/19/18  1010   POTASSIUM mmol/L 3 8 3 7 3 9 4 4   CHLORIDE mmol/L 109* 114* 115* 109*   CO2 mmol/L 27 23 23 24   BUN mg/dL 18 35* 48* 60*   CREATININE mg/dL 0 63 0 63 0 69 1 04   CALCIUM mg/dL 8 6 8 8 9 1 10 8*   AST U/L  --   --  30 53*   ALT U/L  --   --  39 56   ALK PHOS U/L  --   --  74 111   EGFR ml/min/1 73sq m 79 79 76 47       BMP:    Results from last 7 days  Lab Units 11/23/18  0458 11/21/18 0444 11/20/18 0443 11/19/18  1010   POTASSIUM mmol/L 3 8 3 7 3 9 4 4   CHLORIDE mmol/L 109* 114* 115* 109*   CO2 mmol/L 27 23 23 24   BUN mg/dL 18 35* 48* 60*   CREATININE mg/dL 0 63 0 63 0 69 1 04   CALCIUM mg/dL 8 6 8 8 9 1 10 8*         Magnesium:     Results from last 7 days  Lab Units 11/23/18 0458 11/20/18  0443   MAGNESIUM mg/dL 2 0 2 2       Coags:     Results from last 7 days  Lab Units 11/19/18  1139   PTT seconds >210*   INR  1 25*           Elizabet Lucero MD    Portions of the record may have been created with voice recognition software   Occasional wrong word or "sound a like" substitutions may have occurred due to the inherent limitations of voice recognition software   Read the chart carefully and recognize, using context, where substitutions have occurred

## 2018-11-23 NOTE — SOCIAL WORK
CM received call back on 11/20/18 from son Benji Antoine, explained pt's condition and confusion, told him she requested [facility] and referral was made  Requested he call [facility] and give any information they need  On 11/21/18 call was again received from son, he had not called [facility] and requested he do so      11/23/18 Pt's son called to say he received call from Rafaela Roth at [facility] and CM explained need for financial information  He feels his mother must complete this  Rafaela Roth faxed him application, he requested I give his mother copy of application and he will discuss with her over the phone  Explained to him that though his mother is alert and oriented, unsure if she is capable of completing this responsibly  Explained to him that she is incontinent of both urine and stool and she is very resistant to being cleaned  He did not understand her "condition"  Upon my contacting [facility] again, now there is not a bed available until at least 11/27/18  Called son back and emailed him snf list  Also provided phone number for [facility]  Pt  Is ready for discharge today  Requested son call me with rehab choices

## 2018-11-23 NOTE — PLAN OF CARE
Problem: OCCUPATIONAL THERAPY ADULT  Goal: Performs self-care activities at highest level of function for planned discharge setting  See evaluation for individualized goals  Treatment Interventions: ADL retraining, Functional transfer training, Endurance training, Cognitive reorientation, Patient/family training, Equipment evaluation/education, Compensatory technique education, Energy conservation, Activityengagement          See flowsheet documentation for full assessment, interventions and recommendations  Outcome: Progressing  Limitation: Decreased ADL status, Decreased Safe judgement during ADL, Decreased cognition, Decreased endurance, Decreased self-care trans, Decreased high-level ADLs  Prognosis: Good, Fair  Assessment: Pt participates in OT session with focus on LB dressing and cognitive reorientation to increase I for d/c  Pt scored 3 4/6 0 on ACLS  Please see below for details  Pt mod A LB dressing to don/doff socks while seated in bedside chair  Pt able to doff socks on both feet, but needs A to don 2* decreased ROM  Chair alarm turned on post session  Pt will continue to benefit from activity tolerance, adls, and transfers  Recommendation:  (PT ALREADY BEING FOLLOWED BY GERIATRICS )  OT Discharge Recommendation: Short Term Rehab  OT - OK to Discharge:  Yes

## 2018-11-23 NOTE — SOCIAL WORK
Because financial taya  Was not received, there are no longer any beds at [facility]  Call received from daughter today, she would like referral to [facility] [place] operated by [facility]  Referral sent  Pt  Is ready for discharge when accepted

## 2018-11-24 VITALS
HEART RATE: 65 BPM | HEIGHT: 57 IN | DIASTOLIC BLOOD PRESSURE: 55 MMHG | SYSTOLIC BLOOD PRESSURE: 112 MMHG | WEIGHT: 124 LBS | RESPIRATION RATE: 18 BRPM | BODY MASS INDEX: 26.75 KG/M2 | OXYGEN SATURATION: 91 % | TEMPERATURE: 97.5 F

## 2018-11-24 PROBLEM — E86.0 DEHYDRATION: Status: RESOLVED | Noted: 2018-11-21 | Resolved: 2018-11-24

## 2018-11-24 LAB
BACTERIA BLD CULT: NORMAL
BACTERIA BLD CULT: NORMAL

## 2018-11-24 PROCEDURE — 99239 HOSP IP/OBS DSCHRG MGMT >30: CPT | Performed by: INTERNAL MEDICINE

## 2018-11-24 RX ORDER — ACETAMINOPHEN 325 MG/1
650 TABLET ORAL EVERY 8 HOURS PRN
Qty: 30 TABLET | Refills: 0
Start: 2018-11-24

## 2018-11-24 RX ORDER — AMLODIPINE BESYLATE 10 MG/1
10 TABLET ORAL DAILY
Refills: 0
Start: 2018-11-25

## 2018-11-24 RX ORDER — DIAPER,BRIEF,INFANT-TODD,DISP
EACH MISCELLANEOUS 2 TIMES DAILY
Qty: 30 G | Refills: 0
Start: 2018-11-24 | End: 2018-11-29

## 2018-11-24 RX ORDER — CARVEDILOL 12.5 MG/1
12.5 TABLET ORAL 2 TIMES DAILY WITH MEALS
Refills: 0
Start: 2018-11-24

## 2018-11-24 RX ORDER — ASPIRIN 81 MG/1
81 TABLET ORAL DAILY
Refills: 0
Start: 2018-11-25

## 2018-11-24 RX ORDER — AMOXICILLIN 250 MG
1 CAPSULE ORAL
Refills: 0
Start: 2018-11-24

## 2018-11-24 RX ORDER — DIAPER,BRIEF,INFANT-TODD,DISP
EACH MISCELLANEOUS 2 TIMES DAILY
Status: DISCONTINUED | OUTPATIENT
Start: 2018-11-24 | End: 2018-11-24 | Stop reason: HOSPADM

## 2018-11-24 RX ADMIN — ACETAMINOPHEN 650 MG: 325 TABLET ORAL at 06:46

## 2018-11-24 RX ADMIN — AMLODIPINE BESYLATE 10 MG: 10 TABLET ORAL at 08:29

## 2018-11-24 RX ADMIN — LEVOTHYROXINE SODIUM 25 MCG: 25 TABLET ORAL at 06:46

## 2018-11-24 RX ADMIN — ENOXAPARIN SODIUM 40 MG: 40 INJECTION SUBCUTANEOUS at 08:29

## 2018-11-24 RX ADMIN — ASPIRIN 81 MG: 81 TABLET, COATED ORAL at 08:27

## 2018-11-24 RX ADMIN — ACETAMINOPHEN 650 MG: 325 TABLET ORAL at 14:31

## 2018-11-24 RX ADMIN — HYDROCORTISONE: 1 CREAM TOPICAL at 10:32

## 2018-11-24 RX ADMIN — CARVEDILOL 12.5 MG: 12.5 TABLET, FILM COATED ORAL at 07:35

## 2018-11-24 RX ADMIN — LISINOPRIL 20 MG: 20 TABLET ORAL at 08:29

## 2018-11-24 RX ADMIN — VITAMIN D, TAB 1000IU (100/BT) 1000 UNITS: 25 TAB at 08:27

## 2018-11-24 NOTE — DISCHARGE SUMMARY
Discharge Summary - [facility] 73 Internal Medicine    Patient Information: Jhon Fonseca 80 y o  female MRN: [id number]  Unit/Bed#: [facility] 405-01 Encounter: [id number]    Discharging Physician / Practitioner: Vanessa Bowers DO  PCP: Rizwana Rogers MD  Admission Date: 11/19/2018  Discharge Date: 11/24/18    Disposition:     Other: [address] [facility]    Reason for Admission:   Ambulatory dysfunction and unwitness fall  Dehydration  Hypertensive urgency  Metabolic encephalopathy    Discharge Diagnoses:     Principal Problem:    Ambulatory dysfunction  Active Problems:    Near syncope with unwitnessed fall    NSTEMI (non-ST elevated myocardial infarction) ([facility] [address] )    Toxic metabolic encephalopathy    Hypertensive urgency    Fall    Cognitive impairment    Urinary incontinence    Asymptomatic bacteriuria    Hypothyroidism (acquired)  Resolved Problems:    Leukocytosis    Polycythemia    Acute kidney failure ([facility] )    Hypercalcemia    Dehydration      Consultations During Hospital Stay:  · Cardiology  · Geriatric Medicine    Procedures Performed:     · Echo with EF 55%  · Head CT scan no acute  · Cervical spine CT scan no fracture  · Chest x-ray no acute abnormality    Negative EEG    Significant Findings / Test Results:     · As above    Incidental Findings:   · Cognitive impairment    Test Results Pending at Discharge (will require follow up):   · none     Outpatient Tests Requested:  · TSH in 2 weeks    Complications:  none    Hospital Course:     Jhon Fonseca is a 80 y o  female patient who originally presented to the hospital on 11/19/2018 due to unwitnessed fall and possible near syncopal event  Patient lives independently,  her neighbors noted that newspapers was stacking for 3 days and  got concerned about her which prompted them to call EMS  On EMS arrival, patient was found on the floor with unkempt house and the cat feces in apartment   Patient was conscious but was unable to get up by herself due to generalized weakness, so she was brought to ER  Patient was found to have hypertensive urgency, dehydration, QUIANA , elevated CPK,  hypercalcemia,  leukocytosis and elevated troponin  Negative imaging study for fracture    She was admitted to the hospital hydrated with IV fluid, cardiology consulted, no ACS, likely non STEMI type 2 secondary to dehydration and hypertensive urgency, cardiac echo with EF 55%, negative EEG for seizure,  patient's blood pressure medication were adjusted with improvement in her BP readings   Geriatric consulted regarding metabolic encephalopathy and delirium, likely patient has underlying cognitive impairment, PT recommending snf rehab  Patient's symptoms improving   Currently she is in stable condition,  tolerating oral diet she will be discharged to [facility] follow her family doctor in 1 week    Condition at Discharge: stable     Discharge Day Visit / Exam:     Subjective:    Patient seen and examined  Comfortable sitting in chair  No event overnight  Asymptomatic  Vitals: Blood Pressure: 112/55 (11/24/18 0828)  Pulse: 65 (11/24/18 0828)  Temperature: 97 5 °F (36 4 °C) (11/24/18 0708)  Temp Source: Oral (11/24/18 0708)  Respirations: 18 (11/24/18 0708)  Height: 4' 9" (144 8 cm) (per encounter review) (11/20/18 1435)  Weight - Scale: 56 2 kg (124 lb) (11/19/18 1006)  SpO2: 91 % (11/24/18 0708)  Exam:   Physical Exam  Patient is awake alert in no acute distress  Lung clear to auscultation bilateral  Heart positive S1-S2 no murmur  Abdomen soft nontender positive bowel sounds  Lower extremities no edema  Discussion with Family:  Patient's daughter    Discharge instructions/Information to patient and family:   See after visit summary for information provided to patient and family  Provisions for Follow-Up Care:  See after visit summary for information related to follow-up care and any pertinent home health orders        Planned Readmission: no     Discharge Statement:  I spent 40  minutes discharging the patient  This time was spent on the day of discharge  I had direct contact with the patient on the day of discharge  Greater than 50% of the total time was spent examining patient, answering all patient questions, arranging and discussing plan of care with patient as well as directly providing post-discharge instructions  Additional time then spent on discharge activities  Discharge Medications:  See after visit summary for reconciled discharge medications provided to patient and family        ** Please Note: This note has been constructed using a voice recognition system **

## 2018-12-11 ENCOUNTER — EPISODE CHANGES (OUTPATIENT)
Dept: CASE MANAGEMENT | Facility: HOSPITAL | Age: 83
End: 2018-12-11

## 2018-12-13 ENCOUNTER — EPISODE CHANGES (OUTPATIENT)
Dept: CASE MANAGEMENT | Facility: HOSPITAL | Age: 83
End: 2018-12-13

## 2018-12-13 ENCOUNTER — PATIENT OUTREACH (OUTPATIENT)
Dept: CASE MANAGEMENT | Facility: HOSPITAL | Age: 83
End: 2018-12-13

## 2018-12-20 ENCOUNTER — PATIENT OUTREACH (OUTPATIENT)
Dept: OTHER | Facility: HOSPITAL | Age: 83
End: 2018-12-20

## 2018-12-20 NOTE — PROGRESS NOTES
TC to [facility]  Spoke with Kings Bello who reports that patient is not currently on service and they have not received referral for patient  TC to [facility]  Spoke with JOAQUIN Ramos to clarify if patient was referred to [facility]  Per Arleen Ramos, referral was made to [facility] in [place], [place] as patient will be staying with her dtr down there for about 6 weeks  Arleen Ramos provided me with phone number to [facility] in [place]  TC to [facility] in [place], [place]  Spoke with rep who confirmed that patient is on service with them and is receiving SN, PT, OT, HHA services at this time

## 2019-01-23 ENCOUNTER — PATIENT OUTREACH (OUTPATIENT)
Dept: OTHER | Facility: HOSPITAL | Age: 84
End: 2019-01-23

## 2019-01-23 NOTE — PROGRESS NOTES
TC [facility] in [place], [place]  Spoke with rep who reports that patient is currently on hold with them  Reports that patient was admitted to an inpatient facility in [place] on 12/25/18  Per rep, she will ask patients CM to call me with an update

## 2019-02-07 ENCOUNTER — PATIENT OUTREACH (OUTPATIENT)
Dept: OTHER | Facility: HOSPITAL | Age: 84
End: 2019-02-07

## 2019-02-07 NOTE — PROGRESS NOTES
TC to [facility] in [place], [place]  Spoke with Bethany Rhodes who reports that patient had a [facility] on 1/30/19 and continues on their service  Patient is currently receiving SN, PT, OT, ST, HHA

## 2019-02-21 ENCOUNTER — EPISODE CHANGES (OUTPATIENT)
Dept: CASE MANAGEMENT | Facility: OTHER | Age: 84
End: 2019-02-21